# Patient Record
Sex: FEMALE | Race: WHITE
[De-identification: names, ages, dates, MRNs, and addresses within clinical notes are randomized per-mention and may not be internally consistent; named-entity substitution may affect disease eponyms.]

---

## 2017-03-12 ENCOUNTER — HOSPITAL ENCOUNTER (EMERGENCY)
Dept: HOSPITAL 43 - DL.ED | Age: 62
Discharge: HOME | End: 2017-03-12
Payer: MEDICARE

## 2017-03-12 VITALS — DIASTOLIC BLOOD PRESSURE: 81 MMHG | SYSTOLIC BLOOD PRESSURE: 98 MMHG

## 2017-03-12 DIAGNOSIS — M54.6: ICD-10-CM

## 2017-03-12 DIAGNOSIS — Z88.0: ICD-10-CM

## 2017-03-12 DIAGNOSIS — F17.200: ICD-10-CM

## 2017-03-12 DIAGNOSIS — S00.83XA: ICD-10-CM

## 2017-03-12 DIAGNOSIS — E78.00: ICD-10-CM

## 2017-03-12 DIAGNOSIS — I10: ICD-10-CM
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
## 2017-03-12 DIAGNOSIS — Z88.6: ICD-10-CM

## 2017-03-12 DIAGNOSIS — Z88.8: ICD-10-CM

## 2017-03-12 DIAGNOSIS — Z79.899: ICD-10-CM

## 2017-03-12 DIAGNOSIS — Z95.1: ICD-10-CM

## 2017-03-12 DIAGNOSIS — M54.5: Primary | ICD-10-CM

## 2017-03-12 DIAGNOSIS — M19.90: ICD-10-CM
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
## 2017-03-12 DIAGNOSIS — G89.29: ICD-10-CM

## 2017-03-12 DIAGNOSIS — I95.9: ICD-10-CM

## 2017-03-12 DIAGNOSIS — F41.9: ICD-10-CM

## 2017-03-12 DIAGNOSIS — Z91.81: ICD-10-CM

## 2017-03-12 DIAGNOSIS — E03.9: ICD-10-CM

## 2017-03-12 LAB
CHLORIDE SERPL-SCNC: 99 MMOL/L (ref 101–111)
SODIUM SERPL-SCNC: 132 MMOL/L (ref 135–145)

## 2017-03-12 PROCEDURE — 70450 CT HEAD/BRAIN W/O DYE: CPT

## 2017-03-12 PROCEDURE — 36415 COLL VENOUS BLD VENIPUNCTURE: CPT

## 2017-03-12 PROCEDURE — 72131 CT LUMBAR SPINE W/O DYE: CPT

## 2017-03-12 PROCEDURE — 72192 CT PELVIS W/O DYE: CPT

## 2017-03-12 PROCEDURE — G0480 DRUG TEST DEF 1-7 CLASSES: HCPCS

## 2017-03-12 PROCEDURE — 85025 COMPLETE CBC W/AUTO DIFF WBC: CPT

## 2017-03-12 PROCEDURE — 99285 EMERGENCY DEPT VISIT HI MDM: CPT

## 2017-03-12 PROCEDURE — 72128 CT CHEST SPINE W/O DYE: CPT

## 2017-03-12 PROCEDURE — 80053 COMPREHEN METABOLIC PANEL: CPT

## 2017-03-12 NOTE — EDM.PDOC
ED HPI LOWER BACK PAIN/INJURY





- General


Chief Complaint: Back Pain or Injury


Stated Complaint: IN BY AMBULANCE


Time Seen by Provider: 03/12/17 01:10


Source of Information: Reports: Patient, EMS


History Limitations: Reports: No limitations





- History of Present Illness


INITIAL COMMENTS - FREE TEXT/NARRATIVE: 





c/o low back and left hip pain, Patient reports falling while pulling pajama 

bottoms up. EMS report patient fell in very small bathroom inbetween toilet and 

bathtub, difficult to get patient out of area. Arrive on  long vacuum splint. 

No c/o neck pain. Hx frequent fall and balance problems.  Fell forward last 

week while scrubbing floor then hit head on cupboard while attempting to get 

up. 








- Related Data


Allergies/ADRs: 


 Allergies











Allergy/AdvReac Type Severity Reaction Status Date / Time


 


codeine Allergy  Hives Verified 03/12/17 01:11


 


duloxetine HCl Allergy  Hives Verified 03/12/17 01:11





[From Cymbalta]     


 


gabapentin [From Neurontin] Allergy  Hives Verified 03/12/17 01:11


 


oxycodone [Oxycodone] Allergy  Hives Verified 03/12/17 01:11


 


Penicillins Allergy  Hives Verified 03/12/17 01:11











Home Meds: 


 Home Meds





ALPRAZolam [Xanax] 1 mg PO ASDIRECTED PRN 07/03/14 [History]


ALPRAZolam [Xanax] 2 mg PO QPM 07/03/14 [History]


Clopidogrel [Plavix] 75 mg PO DAILY 07/03/14 [History]


Levothyroxine [Sythroid] 25 mcg PO DAILY 07/03/14 [History]


Metoprolol Tartrate [Lopressor] 25 mg PO Q12HR 07/03/14 [History]


Sertraline [Zoloft] 200 mg PO DAILY 07/03/14 [History]


Simvastatin [Zocor] 40 mg PO BEDTIME 07/03/14 [History]


buPROPion HCl [buPROPion SR] 200 mg PO BID 07/03/14 [History]


Doxepin [SINEquan] 25 mg PO BEDTIME 03/12/17 [History]











Past Medical History


Cardiovascular History: Reports: Bypass, High cholesterol, Hypertension


Musculoskeletal History: Reports: Arthritis, Back pain, chronic


Psychiatric History: Reports: Anxiety


Endocrine/Metabolic History: Reports: Hypothyroidism


Oncologic (Cancer) History: Reports: Other (see below)


Other Oncologic History: right ear





- Past Surgical History


Cardiovascular Surgical History: Reports: Coronary artery bypass





Social & Family History





- Tobacco Use


Smoking Status *Q: Current Every Day Smoker


Years of Tobacco use: 49


Packs/Tins Daily: 0.5


Second Hand Smoke Exposure: Yes





- Alcohol Use


Days Per Week of Alcohol Use: 0





- Recreational Drug Use


Recreational Drug Use: No





ED ROS GENERAL





- Review of Systems


Review Of Systems: See Below


Constitutional: Reports: no symptoms


HEENT: Reports: No symptoms


Respiratory: Reports: no symptoms


Cardiovascular: Reports: No symptoms (intermittent worse with position change 

present for years), Lightheadedness


GI/Abdominal: Reports: No symptoms


Musculoskeletal: Reports: back pain (mid to low back, increase pain with 

movement left hip)





ED EXAM,LOWER BACK PAIN/INJURY





- Physical Exam


Exam: See Below


Exam Limited By: No limitations


General Appearance: alert, other (responses slow)


Eye Exam: bilateral eye: EOMI, PERRL


Ears: normal external exam, normal TMs


Nose: normal inspection


Throat/Mouth: Normal inspection, Other (dry mouth)


Head: normocephalic, other (greeish red hematoma right lateral forehead mild 

tenderness with aplpation)


Neck: normal inspection, full range of motion


Respiratory/Chest: no respiratory distress, lungs clear, normal breath sounds


Cardiovascular: normal peripheral pulses, regular rate, rhythm, no edema


GI/Abdominal: normal bowel sounds, soft


Back Exam: paraspinal tenderness (lumbar), vertebral tenderness (mid thoracic)


Extremities: normal inspection, other (pain left hip low back with movement. ).

  No: normal range of motion, pedal edema, joint swelling, arm pain


Neurological: alert, normal mood/affect, normal plantar flexion, no motor/

sensory deficits, oriented x 3, other (equal strength bilateral no weakness 

pain left hip sacrum with movement )


Psychiatric: normal affect


Skin Exam: Warm, Dry, Intact, Ecchymosis (right forehead)





Course





- Vital Signs


Last Recorded V/S: 





 Last Vital Signs











Temp  98.2 F   03/12/17 01:06


 


Pulse  67   03/12/17 01:06


 


Resp  18   03/12/17 01:06


 


BP  98/81   03/12/17 01:06


 


Pulse Ox  96   03/12/17 01:06














- Orders/Labs/Meds


Orders: 





 Active Orders 24 hr











 Category Date Time Status


 


 Head wo Cont [CT] Urgent Exams  03/12/17 01:14 Ordered


 


 Lumbar Spine wo Cont [CT] Urgent Exams  03/12/17 01:14 Ordered


 


 Pelvis wo Cont [CT] Urgent Exams  03/12/17 01:14 Ordered


 


 Thoracic Spine wo Cont [CT] Urgent Exams  03/12/17 01:14 Ordered


 


 COMPREHENSIVE METABOLIC PN,CMP [CHEM] Stat Lab  03/12/17 01:20 Received


 


 ETOH [ETHANOL BLOOD MEDICAL] [CHEM] Stat Lab  03/12/17 01:35 Ordered


 


 UA W/MICROSCOPIC [URIN] Stat Lab  03/12/17 01:16 Uncollected











Labs: 





 Laboratory Tests











  03/12/17 Range/Units





  01:20 


 


WBC  8.9  (5.0-10.0)  10^3/uL


 


RBC  3.47 L  (4.2-5.4)  10^6/uL


 


Hgb  10.7 L  (12.0-16.0)  g/dL


 


Hct  33.1 L  (37.0-47.0)  %


 


MCV  95.4  ()  fL


 


MCH  30.8  (27.0-34.0)  pg


 


MCHC  32.3 L  (33.0-35.0)  g/dL


 


Plt Count  201  (150-450)  10^3/uL


 


Neut % (Auto)  76.2 H  (42.2-75.2)  %


 


Lymph % (Auto)  16.8 L  (20.5-50.1)  %


 


Mono % (Auto)  6.3  (2-8)  %


 


Eos % (Auto)  0.5 L  (1.0-3.0)  %


 


Baso % (Auto)  0.2  (0.0-1.0)  %














- Radiology Interpretation


Free Text/Narrative:: 





Hed, thoracic, lumbar and pelvis negative. Up at bedside. Initial BP 70's 

systolic. Patient notes that is her "normal". Recheck 90"s. Ambulatory with 

standby. Gait steady.  Declines any need for pain medication. 





Departure





- Departure


Time of Disposition: 03:41


Disposition: Home, Self-Care 01


Condition: good


Clinical Impression: 


 Balance problem





Fall at home


Qualifiers:


 Encounter type: initial encounter Qualified Code(s): W19.XXXA - Unspecified 

fall, initial encounter; Y92.099 - Unspecified place in other non-institutional 

residence as the place of occurrence of the external cause





Back pain


Qualifiers:


 Back pain location: low back pain Chronicity: acute Back pain laterality: 

right Sciatica presence: without sciatica Qualified Code(s): M54.5 - Low back 

pain





Traumatic ecchymosis of forehead


Qualifiers:


 Encounter type: initial encounter Qualified Code(s): S00.83XA - Contusion of 

other part of head, initial encounter





Thoracic back pain


Qualifiers:


 Chronicity: acute Back pain laterality: midline Qualified Code(s): M54.6 - 

Pain in thoracic spine





Hypotension


Qualifiers:


 Hypotension type: unspecified hypotension type Qualified Code(s): I95.9 - 

Hypotension, unspecified





Instructions:  Back Pain, Adult, Easy-to-Read


Forms:  ED Department Discharge


Additional Instructions: 


tyleno or ibuprofen for discomfort


rest


recommend walker 


change positions slowly


follow up with cardiologist or primary care to discuss blood pressures and fall 

frequency. 





- My Orders


Last 24 Hours: 





My Active Orders





03/12/17 01:14


Head wo Cont [CT] Urgent 


Lumbar Spine wo Cont [CT] Urgent 


Pelvis wo Cont [CT] Urgent 


Thoracic Spine wo Cont [CT] Urgent 





03/12/17 01:16


UA W/MICROSCOPIC [URIN] Stat 





03/12/17 01:20


COMPREHENSIVE METABOLIC PN,CMP [CHEM] Stat 





03/12/17 01:35


ETOH [ETHANOL BLOOD MEDICAL] [CHEM] Stat 














- Assessment/Plan


Last 24 Hours: 





My Active Orders





03/12/17 01:14


Head wo Cont [CT] Urgent 


Lumbar Spine wo Cont [CT] Urgent 


Pelvis wo Cont [CT] Urgent 


Thoracic Spine wo Cont [CT] Urgent 





03/12/17 01:16


UA W/MICROSCOPIC [URIN] Stat 





03/12/17 01:20


COMPREHENSIVE METABOLIC PN,CMP [CHEM] Stat 





03/12/17 01:35


ETOH [ETHANOL BLOOD MEDICAL] [CHEM] Stat

## 2017-03-28 ENCOUNTER — HOSPITAL ENCOUNTER (OUTPATIENT)
Dept: HOSPITAL 43 - DL.ED | Age: 62
Setting detail: OBSERVATION
LOS: 1 days | Discharge: HOME | End: 2017-03-29
Attending: HOSPITALIST | Admitting: HOSPITALIST
Payer: MEDICARE

## 2017-03-28 DIAGNOSIS — Z79.82: ICD-10-CM

## 2017-03-28 DIAGNOSIS — J20.9: ICD-10-CM

## 2017-03-28 DIAGNOSIS — F32.9: ICD-10-CM

## 2017-03-28 DIAGNOSIS — E78.00: ICD-10-CM

## 2017-03-28 DIAGNOSIS — I10: ICD-10-CM

## 2017-03-28 DIAGNOSIS — F41.9: ICD-10-CM

## 2017-03-28 DIAGNOSIS — Z88.0: ICD-10-CM

## 2017-03-28 DIAGNOSIS — Z79.02: ICD-10-CM

## 2017-03-28 DIAGNOSIS — E05.90: ICD-10-CM

## 2017-03-28 DIAGNOSIS — F17.210: ICD-10-CM

## 2017-03-28 DIAGNOSIS — Z79.52: ICD-10-CM

## 2017-03-28 DIAGNOSIS — R07.9: Primary | ICD-10-CM

## 2017-03-28 DIAGNOSIS — Z95.5: ICD-10-CM

## 2017-03-28 DIAGNOSIS — Z88.5: ICD-10-CM

## 2017-03-28 DIAGNOSIS — I25.810: ICD-10-CM

## 2017-03-28 DIAGNOSIS — Z79.899: ICD-10-CM

## 2017-03-28 DIAGNOSIS — Z88.8: ICD-10-CM

## 2017-03-28 LAB
CHLORIDE SERPL-SCNC: 103 MMOL/L (ref 101–111)
SODIUM SERPL-SCNC: 135 MMOL/L (ref 135–145)

## 2017-03-28 PROCEDURE — 36415 COLL VENOUS BLD VENIPUNCTURE: CPT

## 2017-03-28 PROCEDURE — 99285 EMERGENCY DEPT VISIT HI MDM: CPT

## 2017-03-28 PROCEDURE — 93010 ELECTROCARDIOGRAM REPORT: CPT

## 2017-03-28 PROCEDURE — 84484 ASSAY OF TROPONIN QUANT: CPT

## 2017-03-28 PROCEDURE — 71020: CPT

## 2017-03-28 PROCEDURE — 96374 THER/PROPH/DIAG INJ IV PUSH: CPT

## 2017-03-28 PROCEDURE — 81001 URINALYSIS AUTO W/SCOPE: CPT

## 2017-03-28 PROCEDURE — 96375 TX/PRO/DX INJ NEW DRUG ADDON: CPT

## 2017-03-28 PROCEDURE — 85610 PROTHROMBIN TIME: CPT

## 2017-03-28 PROCEDURE — G0378 HOSPITAL OBSERVATION PER HR: HCPCS

## 2017-03-28 PROCEDURE — 82553 CREATINE MB FRACTION: CPT

## 2017-03-28 PROCEDURE — 93005 ELECTROCARDIOGRAM TRACING: CPT

## 2017-03-28 PROCEDURE — 82550 ASSAY OF CK (CPK): CPT

## 2017-03-28 PROCEDURE — 85730 THROMBOPLASTIN TIME PARTIAL: CPT

## 2017-03-28 PROCEDURE — 96361 HYDRATE IV INFUSION ADD-ON: CPT

## 2017-03-28 PROCEDURE — 80048 BASIC METABOLIC PNL TOTAL CA: CPT

## 2017-03-28 PROCEDURE — 85025 COMPLETE CBC W/AUTO DIFF WBC: CPT

## 2017-03-28 RX ADMIN — HEPARIN SODIUM SCH: 5000 INJECTION, SOLUTION INTRAVENOUS; SUBCUTANEOUS at 21:21

## 2017-03-28 NOTE — EDM.PDOC
ED HISTORY OF PRESENT ILLNESS





- General


Chief Complaint: Chest Pain


Stated Complaint: CHEST PAIN [phone]


Time Seen by Provider: 03/28/17 16:37


Source of Information: Reports: Patient


History Limitations: Reports: No limitations





- History of Present Illness


INITIAL COMMENTS - FREE TEXT/NARRATIVE: 





Pt states that she has been having a cough and chest congestion for the past 4 

weeks and today. she begin having sharp right sided that radiates to her neck. 

denies shortness of breath, n/v/d.


Symptom Onset Date: 03/28/17


Timing/Duration: Reports: Getting worse


Severity: moderate


Location, General: Reports: chest


Quality: Reports: Ache, Pressure, Same as previous episode


Improves with: Reports: None


Worsens with: Reports: None


Associated Symptoms (General): Reports: no other symptoms





- Related Data


Allergies/ADRs: 


 Allergies











Allergy/AdvReac Type Severity Reaction Status Date / Time


 


codeine Allergy  Hives Verified 03/28/17 16:20


 


duloxetine HCl Allergy  Hives Verified 03/28/17 16:20





[From Cymbalta]     


 


gabapentin [From Neurontin] Allergy  Hives Verified 03/28/17 16:20


 


oxycodone [Oxycodone] Allergy  Hives Verified 03/28/17 16:20


 


Penicillins Allergy  Hives Verified 03/28/17 16:20











Home Meds: 


 Home Meds





ALPRAZolam [Xanax] 1 mg PO ASDIRECTED PRN 07/03/14 [History]


ALPRAZolam [Xanax] 2 mg PO QPM 07/03/14 [History]


Clopidogrel [Plavix] 75 mg PO DAILY 07/03/14 [History]


Levothyroxine [Sythroid] 25 mcg PO DAILY 07/03/14 [History]


Metoprolol Tartrate [Lopressor] 25 mg PO Q12HR 07/03/14 [History]


Sertraline [Zoloft] 200 mg PO DAILY 07/03/14 [History]


Simvastatin [Zocor] 40 mg PO BEDTIME 07/03/14 [History]


buPROPion HCl [buPROPion SR] 200 mg PO BID 07/03/14 [History]


Doxepin [SINEquan] 25 mg PO BEDTIME 03/12/17 [History]


predniSONE [predniSONE] 20 mg PO DAILY 03/28/17 [History]











Past Medical History


Cardiovascular History: Reports: High cholesterol, Hypertension


Psychiatric History: Reports: Anxiety, Depression


Endocrine/Metabolic History: Reports: Hyperthyroidism





- Past Surgical History


HEENT Surgical History: Reports: Other (see below)


Other HEENT Surgeries/Procedures: right ear surgery


Cardiovascular Surgical History: Reports: Coronary artery bypass, Other (see 

below)


Other Cardiovascular Surgeries/Procedures: stents





Social & Family History





- Family History


Family Medical History: Noncontributory





- Tobacco Use


Smoking Status *Q: Current Every Day Smoker


Years of Tobacco use: 49


Packs/Tins Daily: 0.5


Second Hand Smoke Exposure: Yes





- Caffeine Use


Caffeine Use: Reports: Coffee





- Alcohol Use


Days Per Week of Alcohol Use: 0





- Recreational Drug Use


Recreational Drug Use: No





ED ROS GENERAL





- Review of Systems


Review Of Systems: See Below


Cardiovascular: Reports: Chest pain





ED EXAM, GENERAL





- Physical Exam


Exam: See Below


Exam Limited By: No limitations


General Appearance: alert, WD/WN, no apparent distress


Eye Exam: bilateral eye: PERRL


Ears: normal external exam, normal canal, hearing grossly normal, normal TMs


Ear Exam: bilateral ear: auricle normal, canal normal, TM normal


Head: atraumatic, normocephalic


Neck: normal inspection, supple, non-tender, full range of motion


Respiratory/Chest: no respiratory distress, lungs clear, normal breath sounds, 

no accessory muscle use, chest non-tender


Cardiovascular: normal peripheral pulses, regular rate, rhythm, no edema, no 

gallop, no JVD, no murmur, no rub


GI/Abdominal: normal bowel sounds, soft, non tender, no organomegaly, no 

distention, no abnormal bruit, no mass


Neurological: alert, oriented, CN II-XII intact, normal cognition, normal gait, 

normal reflexes, no motor/sensory deficits





Course





- Vital Signs


Last Recorded V/S: 


 Last Vital Signs











Temp  97.3 F   03/28/17 16:15


 


Pulse  52 L  03/28/17 16:15


 


Resp  18   03/28/17 16:15


 


BP  135/72   03/28/17 16:15


 


Pulse Ox  97   03/28/17 16:15














- Orders/Labs/Meds


Orders: 


 Active Orders 24 hr











 Category Date Time Status


 


 Cardiac Monitoring [RC] .AS DIRECTED Care  03/28/17 16:45 Active


 


 EKG Documentation Completion [RC] STAT Care  03/28/17 16:46 Active


 


 RT Aerosol Therapy [RC] ASDIRECTED Care  03/28/17 18:53 Active


 


 Sodium Chloride 0.9% [Saline Flush] Med  03/28/17 16:45 Active





 10 ml FLUSH ASDIRECTED PRN   


 


 Saline Lock Insert [OM.PC] Stat Oth  03/28/17 16:45 Ordered








 Medication Orders





Sodium Chloride (Saline Flush)  10 ml FLUSH ASDIRECTED PRN


   PRN Reason: Keep Vein Open


   Last Admin: 03/28/17 17:05  Dose: 10 ml








Labs: 


 Laboratory Tests











  03/28/17 03/28/17 03/28/17 Range/Units





  16:55 16:55 16:55 


 


WBC  11.9 H    (5.0-10.0)  10^3/uL


 


RBC  3.67 L    (4.2-5.4)  10^6/uL


 


Hgb  11.3 L    (12.0-16.0)  g/dL


 


Hct  34.7 L    (37.0-47.0)  %


 


MCV  94.6    ()  fL


 


MCH  30.8    (27.0-34.0)  pg


 


MCHC  32.6 L    (33.0-35.0)  g/dL


 


Plt Count  328    (150-450)  10^3/uL


 


Neut % (Auto)  86.3 H    (42.2-75.2)  %


 


Lymph % (Auto)  11.3 L    (20.5-50.1)  %


 


Mono % (Auto)  1.8 L    (2-8)  %


 


Eos % (Auto)  0.3 L    (1.0-3.0)  %


 


Baso % (Auto)  0.3    (0.0-1.0)  %


 


PT   10.3   (9.0-12.0)  SEC


 


INR   1.0   (0.9-1.2)  


 


APTT   24.8   (22.0-34.0)  SEC


 


Sodium    135  (135-145)  mmol/L


 


Potassium    4.3  (3.6-5.0)  mmol/L


 


Chloride    103  (101-111)  mmol/L


 


Carbon Dioxide    23.0  (21.0-31.0)  mmol/L


 


Anion Gap    13.3  


 


BUN    20 H  (7-18)  mg/dL


 


Creatinine    1.2  (0.6-1.3)  mg/dL


 


Est Cr Clr Drug Dosing    47.27  mL/min


 


Estimated GFR (MDRD)    46  


 


Glucose    108 H  ()  mg/dL


 


Calcium    8.9  (8.4-10.2)  mg/dl


 


Creatine Kinase     ()  IU/L


 


Creatine Kinase Index     (0-2.4)  %


 


CK-MB (CK-2)     (0.4-4.7)  ng/mL


 


Troponin I    < 0.02  (0.00-0.02)  ng/ml


 


Urine Color     (YELLOW)  


 


Urine Appearance     (CLEAR)  


 


Urine pH     (5.0-9.0)  


 


Ur Specific Gravity     (1.005-1.030)  


 


Urine Protein     (NEGATIVE)  


 


Urine Glucose (UA)     (NEGATIVE)  


 


Urine Ketones     (NEGATIVE)  


 


Urine Occult Blood     (NEGATIVE)  


 


Urine Nitrite     (NEGATIVE)  


 


Urine Bilirubin     (NEGATIVE)  


 


Urine Urobilinogen     (0.2-1.0)  mg/dL


 


Ur Leukocyte Esterase     (NEGATIVE)  


 


Urine RBC     /HPF


 


Urine WBC     (0-5/HPF)  /HPF


 


Ur Epithelial Cells     /HPF


 


Amorphous Sediment     (0/HPF)  /HPF


 


Urine Bacteria     (0-FEW/HPF)  /HPF


 


Urine Mucus     /LPF














  03/28/17 03/28/17 Range/Units





  16:55 17:10 


 


WBC    (5.0-10.0)  10^3/uL


 


RBC    (4.2-5.4)  10^6/uL


 


Hgb    (12.0-16.0)  g/dL


 


Hct    (37.0-47.0)  %


 


MCV    ()  fL


 


MCH    (27.0-34.0)  pg


 


MCHC    (33.0-35.0)  g/dL


 


Plt Count    (150-450)  10^3/uL


 


Neut % (Auto)    (42.2-75.2)  %


 


Lymph % (Auto)    (20.5-50.1)  %


 


Mono % (Auto)    (2-8)  %


 


Eos % (Auto)    (1.0-3.0)  %


 


Baso % (Auto)    (0.0-1.0)  %


 


PT    (9.0-12.0)  SEC


 


INR    (0.9-1.2)  


 


APTT    (22.0-34.0)  SEC


 


Sodium    (135-145)  mmol/L


 


Potassium    (3.6-5.0)  mmol/L


 


Chloride    (101-111)  mmol/L


 


Carbon Dioxide    (21.0-31.0)  mmol/L


 


Anion Gap    


 


BUN    (7-18)  mg/dL


 


Creatinine    (0.6-1.3)  mg/dL


 


Est Cr Clr Drug Dosing    mL/min


 


Estimated GFR (MDRD)    


 


Glucose    ()  mg/dL


 


Calcium    (8.4-10.2)  mg/dl


 


Creatine Kinase  42   ()  IU/L


 


Creatine Kinase Index  2.4   (0-2.4)  %


 


CK-MB (CK-2)  1.00   (0.4-4.7)  ng/mL


 


Troponin I    (0.00-0.02)  ng/ml


 


Urine Color   Yellow  (YELLOW)  


 


Urine Appearance   Slightly cloudy  (CLEAR)  


 


Urine pH   5.0  (5.0-9.0)  


 


Ur Specific Gravity   1.010  (1.005-1.030)  


 


Urine Protein   Negative  (NEGATIVE)  


 


Urine Glucose (UA)   Negative  (NEGATIVE)  


 


Urine Ketones   Negative  (NEGATIVE)  


 


Urine Occult Blood   Negative  (NEGATIVE)  


 


Urine Nitrite   Negative  (NEGATIVE)  


 


Urine Bilirubin   Negative  (NEGATIVE)  


 


Urine Urobilinogen   0.2  (0.2-1.0)  mg/dL


 


Ur Leukocyte Esterase   Negative  (NEGATIVE)  


 


Urine RBC   0-5  /HPF


 


Urine WBC   0-5  (0-5/HPF)  /HPF


 


Ur Epithelial Cells   Few  /HPF


 


Amorphous Sediment   Moderate H  (0/HPF)  /HPF


 


Urine Bacteria   Rare  (0-FEW/HPF)  /HPF


 


Urine Mucus   Moderate H  /LPF











Meds: 


Medications











Generic Name Dose Route Start Last Admin





  Trade Name Freq  PRN Reason Stop Dose Admin


 


Sodium Chloride  10 ml  03/28/17 16:45  03/28/17 17:05





  Saline Flush  FLUSH   10 ml





  ASDIRECTED PRN   Administration





  Keep Vein Open   














Discontinued Medications














Generic Name Dose Route Start Last Admin





  Trade Name Freq  PRN Reason Stop Dose Admin


 


Albuterol/Ipratropium  3 ml  03/28/17 18:53  03/28/17 19:07





  Duoneb 3.0-0.5 Mg/3 Ml  NEB  03/28/17 18:54  3 ml





  ONETIME ONE   Administration


 


Aspirin  324 mg  03/28/17 16:46  03/28/17 17:03





  Aspirin  PO  03/28/17 16:47  324 mg





  ONETIME ONE   Administration


 


Sodium Chloride  1,000 mls @ 1,000 mls/hr  03/28/17 16:45  03/28/17 17:04





  Normal Saline  IV  03/28/17 17:44  1,000 mls/hr





  .BOLUS ONE   Administration


 


Morphine Sulfate  4 mg  03/28/17 16:46  03/28/17 17:03





  Morphine  IVPUSH  03/28/17 16:47  4 mg





  ONETIME ONE   Administration


 


Ondansetron HCl  4 mg  03/28/17 16:48  03/28/17 17:03





  Zofran  IV  03/28/17 16:49  4 mg





  ONETIME ONE   Administration














- Re-Assessments/Exams


Free Text/Narrative Re-Assessment/Exam: 





03/28/17 19:21


Spoke with Dr. Anand for admission. will evaluate pt shortly





03/28/17 19:40


Admission per Dr. anand





Departure





- Departure


Time of Disposition: 19:39


Disposition: Admitted As Inpatient 65


Condition: good


Clinical Impression: 


 Acute coronary syndrome





Instructions:  Nonspecific Chest Pain, Easy-to-Read


Forms:  ED Department Discharge





- My Orders


Last 24 Hours: 


My Active Orders





03/28/17 16:45


Cardiac Monitoring [RC] .AS DIRECTED 


Sodium Chloride 0.9% [Saline Flush]   10 ml FLUSH ASDIRECTED PRN 


Saline Lock Insert [OM.PC] Stat 





03/28/17 16:46


EKG Documentation Completion [RC] STAT 





03/28/17 18:53


RT Aerosol Therapy [RC] ASDIRECTED 














- Assessment/Plan


Last 24 Hours: 


My Active Orders





03/28/17 16:45


Cardiac Monitoring [RC] .AS DIRECTED 


Sodium Chloride 0.9% [Saline Flush]   10 ml FLUSH ASDIRECTED PRN 


Saline Lock Insert [OM.PC] Stat 





03/28/17 16:46


EKG Documentation Completion [RC] STAT 





03/28/17 18:53


RT Aerosol Therapy [RC] ASDIRECTED

## 2017-03-29 VITALS — DIASTOLIC BLOOD PRESSURE: 71 MMHG | SYSTOLIC BLOOD PRESSURE: 116 MMHG

## 2017-03-29 RX ADMIN — HEPARIN SODIUM SCH: 5000 INJECTION, SOLUTION INTRAVENOUS; SUBCUTANEOUS at 05:40

## 2017-03-29 NOTE — EKG
03/28/2017 - MERCEDES OLIVARES -

 

TIME:  1611 p.m.

 

EKG shows sinus bradycardia.  There is incomplete right bundle branch block.

Nonspecific T-wave abnormalities.

 

DD:  03/29/2017 20:36:43

DT:  03/29/2017 22:26:30

[facility]

Job #:  [id number]

## 2017-03-29 NOTE — HP
CHIEF COMPLAINT:  Chest pain.

 

HISTORY OF PRESENT ILLNESS:  Ms. Garrido is a 61-year-old female with medical

history of coronary artery disease, hypertension.  The patient presented with

complaint of chest pain yesterday while she [place].

Started pain of dull in nature on the right side of her chest radiating towards

neck with associated generalized body malaise.  No nausea or vomiting.

 

REVIEW OF SYSTEMS:

A 10-point review of system performed.  No other pertinent findings except as

noted above.

 

PAST MEDICAL HISTORY:

1. Tobacco use disorder.

2. Coronary artery disease.

 

ALLERGIES:  She is allergic to Cymbalta, gabapentin, oxycodone, and penicillin.

 

 

 

FAMILY HISTORY:  Reviewed and considered noncontributory.

 

SOCIAL HISTORY:  Tobacco use disorder.

 

OBJECTIVE:  General:  The patient is alert, oriented to place, time, and person.

Head:  Atraumatic and normocephalic.

Chest:  Clear to auscultation.

CVS:  Regular rate and rhythm.

Abdomen:  Soft, nontender.

Extremities:  No pedal edema.  No finger clubbing.

Skin:  No rash.

Neuro:  Grossly nonfocal.

Vital Signs:  Reviewed.

 

IMAGING:  EKG shows normal sinus rhythm.  There is incomplete right bundle-

branch block.  Q-wave inversion at septal leads.

 

ASSESSMENT AND PLAN:

1. Chest pain.  This is probably musculoskeletal.  However, given her history

    of coronary artery disease with nonspecific EKG changes, I think it is

    prudent to admit the patient to the hospital and rule out acute coronary

    syndrome.

2. Tobacco use disorder.  Counseling provided.



Plan:



3. We will admit the patient to medical floor.

4. Obtain troponin every 6 hours 

5. Aspirin.

6. Keep patient on Plavix.

7. Antiemetic protocol.

8. Intravenous fluid and normal saline at 75 mL an hour.

 

DD:  03/28/2017 22:31:43

DT:  03/29/2017 02:09:40

[facility]

Job #:  [id number]

OTTO

## 2017-03-30 NOTE — DISCH
FINAL DIAGNOSES:

1. Chest pain, likely musculoskeletal.

2. Tobacco use disorder.

3. Possible chronic obstructive pulmonary disease.

4. Acute bronchitis.

5. Hypertension.

6. Coronary artery disease.

 

SUMMARY OF HOSPITAL COURSE:  Ms. Natalee Garrido presented with complaint of

right-sided chest pain.  She was evaluated with cardiac enzymes and EKG.

Cardiac enzymes were negative.  She indicates that she has been coughing and

does have some shortness of breath.  Sometimes she expectorates the greenish

sputum.  Her chest x-ray did not show obvious infiltrate.  The patient probably

has acute bronchitis.  I started her on doxycycline for 7 days.  She will follow

up with her primary care provider.  She has also been advised to follow up with

Cardiology for possible stress test.  She is known to have coronary artery

disease with previous bypass.

 

PHYSICAL EXAMINATION:

General:  At discharge, the patient is alert, oriented to place, time, and

person.

Head:  Atraumatic and normocephalic.

Ear, Nose, and throat:  Unremarkable.

Chest:  Diminished air entry bilaterally.

CVS:  Regular rate and rhythm.

Abdomen:  Soft, nontender.

Extremities:  No pedal edema.  No finger clubbing.

Skin:  No rash.

 

DD:  03/29/2017 10:26:07

DT:  03/30/2017 02:30:17

[facility]

Job #:  [id number]

## 2017-11-14 ENCOUNTER — HOSPITAL ENCOUNTER (EMERGENCY)
Dept: HOSPITAL 43 - DL.ED | Age: 62
LOS: 1 days | Discharge: HOME | End: 2017-11-15
Payer: MEDICARE

## 2017-11-14 VITALS — DIASTOLIC BLOOD PRESSURE: 64 MMHG | SYSTOLIC BLOOD PRESSURE: 141 MMHG

## 2017-11-14 DIAGNOSIS — F32.9: ICD-10-CM

## 2017-11-14 DIAGNOSIS — Z88.6: ICD-10-CM

## 2017-11-14 DIAGNOSIS — Z79.82: ICD-10-CM

## 2017-11-14 DIAGNOSIS — E78.00: ICD-10-CM

## 2017-11-14 DIAGNOSIS — Z79.899: ICD-10-CM

## 2017-11-14 DIAGNOSIS — Z88.5: ICD-10-CM

## 2017-11-14 DIAGNOSIS — F17.210: ICD-10-CM

## 2017-11-14 DIAGNOSIS — K59.04: ICD-10-CM

## 2017-11-14 DIAGNOSIS — N39.0: Primary | ICD-10-CM

## 2017-11-14 DIAGNOSIS — Z88.8: ICD-10-CM

## 2017-11-14 DIAGNOSIS — Z79.02: ICD-10-CM

## 2017-11-14 DIAGNOSIS — Z88.0: ICD-10-CM

## 2017-11-14 DIAGNOSIS — I10: ICD-10-CM

## 2017-11-14 LAB
CHLORIDE SERPL-SCNC: 101 MMOL/L (ref 101–111)
SODIUM SERPL-SCNC: 136 MMOL/L (ref 135–145)

## 2017-11-14 PROCEDURE — 96361 HYDRATE IV INFUSION ADD-ON: CPT

## 2017-11-14 PROCEDURE — 96365 THER/PROPH/DIAG IV INF INIT: CPT

## 2017-11-14 PROCEDURE — 36415 COLL VENOUS BLD VENIPUNCTURE: CPT

## 2017-11-14 PROCEDURE — 93010 ELECTROCARDIOGRAM REPORT: CPT

## 2017-11-14 PROCEDURE — 85025 COMPLETE CBC W/AUTO DIFF WBC: CPT

## 2017-11-14 PROCEDURE — 81001 URINALYSIS AUTO W/SCOPE: CPT

## 2017-11-14 PROCEDURE — 74020: CPT

## 2017-11-14 PROCEDURE — 87086 URINE CULTURE/COLONY COUNT: CPT

## 2017-11-14 PROCEDURE — 99284 EMERGENCY DEPT VISIT MOD MDM: CPT

## 2017-11-14 PROCEDURE — 80053 COMPREHEN METABOLIC PANEL: CPT

## 2017-11-14 PROCEDURE — 84484 ASSAY OF TROPONIN QUANT: CPT

## 2017-11-14 PROCEDURE — 93005 ELECTROCARDIOGRAM TRACING: CPT

## 2017-11-14 NOTE — EDM.PDOC
ED HPI GENERAL MEDICAL PROBLEM





- General


Chief Complaint: Gastrointestinal Problem


Stated Complaint: IN BY AMBULANCE [id number]


Time Seen by Provider: 11/14/17 22:39


Source of Information: Reports: Patient, EMS Notes Reviewed


History Limitations: Reports: No Limitations





- History of Present Illness


INITIAL COMMENTS - FREE TEXT/NARRATIVE: 





61 yo Female c/o weakness and nausea X one week  Pt. states very little oral 

intake and emesis last friday and today of soup


Onset Date: 11/07/17


Onset Time: 17:00


Duration: Day(s):


Location: Reports: Abdomen, Generalized


Severity: Moderate


Improves with: Reports: None


Worsens with: Reports: None


Associated Symptoms: Reports: Loss of Appetite, Malaise, Nausea/Vomiting, 

Weakness


  ** Headache


Pain Score (Numeric/FACES): 6





- Related Data


 Allergies











Allergy/AdvReac Type Severity Reaction Status Date / Time


 


codeine Allergy  Hives Verified 11/14/17 22:37


 


duloxetine HCl Allergy  Hives Verified 11/14/17 22:37





[From Cymbalta]     


 


gabapentin [From Neurontin] Allergy  Hives Verified 11/14/17 22:37


 


oxycodone [Oxycodone] Allergy  Hives Verified 11/14/17 22:37


 


Penicillins Allergy  Hives Verified 11/14/17 22:37











Home Meds: 


 Home Meds





ALPRAZolam [Xanax] 1 mg PO ASDIRECTED PRN 07/03/14 [History]


ALPRAZolam [Xanax] 2 mg PO QPM 07/03/14 [History]


Clopidogrel [Plavix] 75 mg PO DAILY 07/03/14 [History]


Levothyroxine [Synthroid] 25 mcg PO DAILY 07/03/14 [History]


Metoprolol Tartrate [Lopressor] 25 mg PO Q12HR 07/03/14 [History]


Sertraline [Zoloft] 200 mg PO DAILY 07/03/14 [History]


Simvastatin [Zocor] 40 mg PO BEDTIME 07/03/14 [History]


buPROPion HCl [buPROPion SR] 200 mg PO BID 07/03/14 [History]


Doxepin [SINEquan] 25 mg PO BEDTIME 03/12/17 [History]


Albuterol [IMW: Albuterol HFA] 8 gm IH Q6H PRN #8 gm 03/29/17 [Rx]


Doxycycline Monohydrate [IJD: Doxycycline Monohydrate] 100 mg PO .TWICE DAILY #

14 cap 03/29/17 [Rx]


predniSONE 10 mg PO .TAPER #7 tablet 03/29/17 [Rx]


Aspirin 81 mg PO DAILY 11/14/17 [History]











Past Medical History


HEENT History: Reports: Hard of Hearing, Impaired Vision, Otitis Media


Other HEENT History: infection to the right ear


Cardiovascular History: Reports: Bypass, High Cholesterol, Hypertension, Stents


Other Cardiovascular History: triple bypass and 1 stent to the heart


Musculoskeletal History: Reports: Arthritis, Back Pain, Chronic


Psychiatric History: Reports: Anxiety, Depression


Endocrine/Metabolic History: Reports: Hyperthyroidism


Oncologic (Cancer) History: Reports: Other (See Below)


Other Oncologic History: right ear





- Past Surgical History


HEENT Surgical History: Reports: Other (See Below)


Cardiovascular Surgical History: Reports: Coronary Artery Bypass, Other (See 

Below)





Social & Family History





- Family History


Family Medical History: Noncontributory





- Tobacco Use


Smoking Status *Q: Current Every Day Smoker


Years of Tobacco use: 50


Packs/Tins Daily: 0.5


Used Tobacco, but Quit: No


Second Hand Smoke Exposure: Yes





- Caffeine Use


Caffeine Use: Reports: Coffee





- Alcohol Use


Days Per Week of Alcohol Use: 0





- Recreational Drug Use


Recreational Drug Use: No





ED ROS GENERAL





- Review of Systems


Review Of Systems: See Below


Constitutional: Reports: Weakness, Fatigue, Decreased Appetite


HEENT: Reports: No Symptoms


Respiratory: Reports: No Symptoms


Cardiovascular: Reports: No Symptoms


Endocrine: Reports: No Symptoms


GI/Abdominal: Reports: Abdominal Pain, Constipation, Nausea, Vomiting


: Reports: No Symptoms


Musculoskeletal: Reports: No Symptoms


Skin: Reports: No Symptoms


Neurological: Reports: No Symptoms


Psychiatric: Reports: No Symptoms


Hematologic/Lymphatic: Reports: No Symptoms


Immunologic: Reports: No Symptoms





ED EXAM, GENERAL





- Physical Exam


Exam: See Below


Exam Limited By: No Limitations


General Appearance: Alert, No Apparent Distress, Lethargic


Eye Exam: Bilateral Eye: EOMI, PERRL


Ears: Normal External Exam


Nose: Normal Inspection


Throat/Mouth: Normal Inspection


Head: Atraumatic, Normocephalic


Neck: Normal Inspection


Respiratory/Chest: No Respiratory Distress, Lungs Clear


Cardiovascular: Normal Peripheral Pulses, Regular Rate, Rhythm


GI/Abdominal: Soft, Tender (on deep palpation), Abnormal Bowel Sounds (decreased

)


Back Exam: Normal Inspection


Extremities: Normal Inspection, Normal Range of Motion


Neurological: Alert, Oriented, CN II-XII Intact


Psychiatric: Normal Affect


Skin Exam: Warm, Dry, Intact, Normal Color


Lymphatic: No Adenopathy





Course





- Vital Signs


Last Recorded V/S: 


 Last Vital Signs











Temp  36.1 C   11/14/17 22:32


 


Pulse  50 L  11/14/17 22:32


 


Resp  10 L  11/14/17 22:32


 


BP  141/64 H  11/14/17 22:32


 


Pulse Ox  100   11/14/17 22:32














- Orders/Labs/Meds


Orders: 


 Active Orders 24 hr











 Category Date Time Status


 


 EKG Documentation Completion [RC] STAT Care  11/14/17 23:03 Ordered


 


 CULTURE URINE [RM] Stat Lab  11/14/17 23:33 Uncollected


 


 Ciprofloxacin in D5W [Cipro in D5W 400 MG/200 ML] 400 Med  11/14/17 23:34 

Active





 mg   





 Premix Bag 1 bag   





 IV ONETIME   


 


 Sodium Chloride 0.9% [Normal Saline] 1,000 ml Med  11/14/17 22:45 Active





 IV ASDIRECTED   








 Medication Orders





Sodium Chloride (Normal Saline)  1,000 mls @ 125 mls/hr IV ASDIRECTED PAPO


   Last Admin: 11/14/17 23:03  Dose: 125 mls/hr


Ciprofloxacin/Dextrose 400 mg/ (Premix)  200 mls @ 200 mls/hr IV ONETIME ONE


   Stop: 11/15/17 00:33


   Last Admin: 11/14/17 23:48  Dose: 200 mls/hr








Labs: 


 Laboratory Tests











  11/14/17 11/14/17 11/14/17 Range/Units





  22:43 22:43 22:43 


 


WBC  7.6    (5.0-10.0)  10^3/uL


 


RBC  4.19 L    (4.2-5.4)  10^6/uL


 


Hgb  12.6    (12.0-16.0)  g/dL


 


Hct  38.9    (37.0-47.0)  %


 


MCV  92.8    ()  fL


 


MCH  30.1    (27.0-34.0)  pg


 


MCHC  32.4 L    (33.0-35.0)  g/dL


 


Plt Count  245  D    (150-450)  10^3/uL


 


Neut % (Auto)  41.9 L    (42.2-75.2)  %


 


Lymph % (Auto)  47.9    (20.5-50.1)  %


 


Mono % (Auto)  7.7    (2-8)  %


 


Eos % (Auto)  2.1    (1.0-3.0)  %


 


Baso % (Auto)  0.4    (0.0-1.0)  %


 


Sodium   136   (135-145)  mmol/L


 


Potassium   3.7   (3.6-5.0)  mmol/L


 


Chloride   101   (101-111)  mmol/L


 


Carbon Dioxide   26.0   (21.0-31.0)  mmol/L


 


Anion Gap   12.7   


 


BUN   16   (7-18)  mg/dL


 


Creatinine   1.0   (0.6-1.3)  mg/dL


 


Est Cr Clr Drug Dosing   56.00   mL/min


 


Estimated GFR (MDRD)   56   


 


BUN/Creatinine Ratio   16.00   


 


Glucose   97   ()  mg/dL


 


Calcium   9.8   (8.4-10.2)  mg/dl


 


Total Bilirubin   0.3   (0.2-1.0)  mg/dL


 


AST   27   (10-42)  IU/L


 


ALT   21   (10-60)  IU/L


 


Alkaline Phosphatase   84   ()  IU/L


 


Troponin I    < 0.02  (0.00-0.02)  ng/ml


 


Total Protein   8.0   (6.7-8.2)  g/dl


 


Albumin   4.0   (3.2-5.5)  g/dl


 


Globulin   4.0   


 


Albumin/Globulin Ratio   1.00   


 


Urine Color     (YELLOW)  


 


Urine Appearance     (CLEAR)  


 


Urine pH     (5.0-9.0)  


 


Ur Specific Gravity     (1.005-1.030)  


 


Urine Protein     (NEGATIVE)  


 


Urine Glucose (UA)     (NEGATIVE)  


 


Urine Ketones     (NEGATIVE)  


 


Urine Occult Blood     (NEGATIVE)  


 


Urine Nitrite     (NEGATIVE)  


 


Urine Bilirubin     (NEGATIVE)  


 


Urine Urobilinogen     (0.2-1.0)  mg/dL


 


Ur Leukocyte Esterase     (NEGATIVE)  


 


Urine RBC     /HPF


 


Urine WBC     (0-5/HPF)  /HPF


 


Ur Epithelial Cells     /HPF


 


Urine Bacteria     (0-FEW/HPF)  /HPF


 


Hyaline Casts     /LPF














  11/14/17 Range/Units





  23:09 


 


WBC   (5.0-10.0)  10^3/uL


 


RBC   (4.2-5.4)  10^6/uL


 


Hgb   (12.0-16.0)  g/dL


 


Hct   (37.0-47.0)  %


 


MCV   ()  fL


 


MCH   (27.0-34.0)  pg


 


MCHC   (33.0-35.0)  g/dL


 


Plt Count   (150-450)  10^3/uL


 


Neut % (Auto)   (42.2-75.2)  %


 


Lymph % (Auto)   (20.5-50.1)  %


 


Mono % (Auto)   (2-8)  %


 


Eos % (Auto)   (1.0-3.0)  %


 


Baso % (Auto)   (0.0-1.0)  %


 


Sodium   (135-145)  mmol/L


 


Potassium   (3.6-5.0)  mmol/L


 


Chloride   (101-111)  mmol/L


 


Carbon Dioxide   (21.0-31.0)  mmol/L


 


Anion Gap   


 


BUN   (7-18)  mg/dL


 


Creatinine   (0.6-1.3)  mg/dL


 


Est Cr Clr Drug Dosing   mL/min


 


Estimated GFR (MDRD)   


 


BUN/Creatinine Ratio   


 


Glucose   ()  mg/dL


 


Calcium   (8.4-10.2)  mg/dl


 


Total Bilirubin   (0.2-1.0)  mg/dL


 


AST   (10-42)  IU/L


 


ALT   (10-60)  IU/L


 


Alkaline Phosphatase   ()  IU/L


 


Troponin I   (0.00-0.02)  ng/ml


 


Total Protein   (6.7-8.2)  g/dl


 


Albumin   (3.2-5.5)  g/dl


 


Globulin   


 


Albumin/Globulin Ratio   


 


Urine Color  Dark yellow  (YELLOW)  


 


Urine Appearance  Slightly cloudy  (CLEAR)  


 


Urine pH  5.5  (5.0-9.0)  


 


Ur Specific Gravity  >= 1.030  (1.005-1.030)  


 


Urine Protein  Negative  (NEGATIVE)  


 


Urine Glucose (UA)  Negative  (NEGATIVE)  


 


Urine Ketones  Trace H  (NEGATIVE)  


 


Urine Occult Blood  Negative  (NEGATIVE)  


 


Urine Nitrite  Negative  (NEGATIVE)  


 


Urine Bilirubin  Negative  (NEGATIVE)  


 


Urine Urobilinogen  0.2  (0.2-1.0)  mg/dL


 


Ur Leukocyte Esterase  Trace H  (NEGATIVE)  


 


Urine RBC  0-5  /HPF


 


Urine WBC  5-10 H  (0-5/HPF)  /HPF


 


Ur Epithelial Cells  Moderate H  /HPF


 


Urine Bacteria  Moderate H  (0-FEW/HPF)  /HPF


 


Hyaline Casts  Few H  /LPF











Meds: 


Medications











Generic Name Dose Route Start Last Admin





  Trade Name Freq  PRN Reason Stop Dose Admin


 


Sodium Chloride  1,000 mls @ 125 mls/hr  11/14/17 22:45  11/14/17 23:03





  Normal Saline  IV   125 mls/hr





  ASDIRECTED PAPO   Administration


 


Ciprofloxacin/Dextrose 400 mg/  200 mls @ 200 mls/hr  11/14/17 23:34  11/14/17 

23:48





  Premix  IV  11/15/17 00:33  200 mls/hr





  ONETIME ONE   Administration














Discontinued Medications














Generic Name Dose Route Start Last Admin





  Trade Name Octaviano  PRN Reason Stop Dose Admin


 


Nitrofurantoin Macrocrystals  100 mg  11/15/17 00:09  





  Macrobid  PO  11/15/17 00:10  





  ONETIME ONE   


 


Ondansetron HCl  4 mg  11/14/17 22:41  11/14/17 23:04





  Zofran  IV  11/14/17 22:42  4 mg





  ONETIME ONE   Administration














Departure





- Departure


Time of Disposition: 00:12


Disposition: Home, Self-Care 01


Condition: Good


Clinical Impression: 


 UTI, Urinary tract infectious disease





Constipation


Qualifiers:


 Constipation type: chronic idiopathic constipation Qualified Code(s): K59.04 - 

Chronic idiopathic constipation








- Discharge Information


Instructions:  Constipation, Adult, Easy-to-Read


Forms:  ED Department Discharge


Additional Instructions: 


Rest





Increase intake of Water





Increase intake of Fresh Fruits and Vegetables





Today:  1)  Sip one bottle of Magnesium Citrate and Give self one Fleet Enema





            2)  Drink Three glasses of Water





WAIT 2-3 HOURS AND IF NO RESULTS





            3)  Take 1 tablespoon ( 15 cc) of CASTOR OIL





            4)  Drink 3 glasses of Water








STOP CONSTIPATING FOODS: Dairy, Bread, Cheese, Meat and Processed Foods








Daily: CHEW 4 DRIED PRUNES





F/U w/ PCP for Gastroenterology Evaluation





- My Orders


Last 24 Hours: 


My Active Orders





11/14/17 22:45


Sodium Chloride 0.9% [Normal Saline] 1,000 ml IV ASDIRECTED 





11/14/17 23:03


EKG Documentation Completion [RC] STAT 





11/14/17 23:33


CULTURE URINE [RM] Stat 





11/14/17 23:34


Ciprofloxacin in D5W [Cipro in D5W 400 MG/200 ML] 400 mg   Premix Bag 1 bag IV 

ONETIME 














- Assessment/Plan


Last 24 Hours: 


My Active Orders





11/14/17 22:45


Sodium Chloride 0.9% [Normal Saline] 1,000 ml IV ASDIRECTED 





11/14/17 23:03


EKG Documentation Completion [RC] STAT 





11/14/17 23:33


CULTURE URINE [RM] Stat 





11/14/17 23:34


Ciprofloxacin in D5W [Cipro in D5W 400 MG/200 ML] 400 mg   Premix Bag 1 bag IV 

ONETIME

## 2017-11-19 ENCOUNTER — HOSPITAL ENCOUNTER (EMERGENCY)
Dept: HOSPITAL 43 - DL.ED | Age: 62
Discharge: HOME | End: 2017-11-19
Payer: MEDICARE

## 2017-11-19 VITALS — DIASTOLIC BLOOD PRESSURE: 54 MMHG | SYSTOLIC BLOOD PRESSURE: 98 MMHG

## 2017-11-19 DIAGNOSIS — Z88.5: ICD-10-CM

## 2017-11-19 DIAGNOSIS — Z88.1: ICD-10-CM

## 2017-11-19 DIAGNOSIS — Z79.899: ICD-10-CM

## 2017-11-19 DIAGNOSIS — F17.210: ICD-10-CM

## 2017-11-19 DIAGNOSIS — Z79.82: ICD-10-CM

## 2017-11-19 DIAGNOSIS — Z88.0: ICD-10-CM

## 2017-11-19 DIAGNOSIS — I10: ICD-10-CM

## 2017-11-19 DIAGNOSIS — K59.01: Primary | ICD-10-CM

## 2017-11-19 PROCEDURE — 99283 EMERGENCY DEPT VISIT LOW MDM: CPT

## 2017-11-19 PROCEDURE — 74000: CPT

## 2017-11-19 NOTE — EDM.PDOC
ED HPI GENERAL MEDICAL PROBLEM





- General


Chief Complaint: Gastrointestinal Problem


Stated Complaint: UNABLE TO USE BATHROOM


Time Seen by Provider: 11/19/17 00:34


Source of Information: Reports: Patient


History Limitations: Reports: No Limitations





- History of Present Illness


INITIAL COMMENTS - FREE TEXT/NARRATIVE: 








long h/o constipation, was here few days ago for same. tried mag cit and eating 

more fruits but not getting better and feeling worse.





- Related Data


 Allergies











Allergy/AdvReac Type Severity Reaction Status Date / Time


 


ciprofloxacin Allergy  Itching Verified 11/19/17 00:21


 


codeine Allergy  Hives Verified 11/19/17 00:21


 


duloxetine HCl Allergy  Hives Verified 11/19/17 00:21





[From Cymbalta]     


 


gabapentin [From Neurontin] Allergy  Hives Verified 11/19/17 00:21


 


oxycodone [Oxycodone] Allergy  Hives Verified 11/19/17 00:21


 


Penicillins Allergy  Hives Verified 11/19/17 00:21











Home Meds: 


 Home Meds





ALPRAZolam [Xanax] 1 mg PO ASDIRECTED PRN 07/03/14 [History]


ALPRAZolam [Xanax] 2 mg PO QPM 07/03/14 [History]


Clopidogrel [Plavix] 75 mg PO DAILY 07/03/14 [History]


Levothyroxine [Synthroid] 25 mcg PO DAILY 07/03/14 [History]


Metoprolol Tartrate [Lopressor] 25 mg PO Q12HR 07/03/14 [History]


Sertraline [Zoloft] 200 mg PO DAILY 07/03/14 [History]


Simvastatin [Zocor] 40 mg PO BEDTIME 07/03/14 [History]


buPROPion HCl [buPROPion SR] 200 mg PO BID 07/03/14 [History]


Doxepin [SINEquan] 25 mg PO BEDTIME 03/12/17 [History]


Albuterol [IMW: Albuterol HFA] 8 gm IH Q6H PRN #8 gm 03/29/17 [Rx]


Doxycycline Monohydrate [IJD: Doxycycline Monohydrate] 100 mg PO .TWICE DAILY #

14 cap 03/29/17 [Rx]


predniSONE 10 mg PO .TAPER #7 tablet 03/29/17 [Rx]


Aspirin 81 mg PO DAILY 11/14/17 [History]











Past Medical History


HEENT History: Reports: Hard of Hearing, Impaired Vision, Otitis Media


Other HEENT History: infection to the right ear


Cardiovascular History: Reports: Bypass, High Cholesterol, Hypertension, Stents


Other Cardiovascular History: triple bypass and 1 stent to the heart


Gastrointestinal History: Reports: GERD


OB/GYN History: Reports: Pregnancy


Musculoskeletal History: Reports: Arthritis, Back Pain, Chronic


Psychiatric History: Reports: Anxiety, Depression


Endocrine/Metabolic History: Reports: Hyperthyroidism


Oncologic (Cancer) History: Reports: Other (See Below)


Other Oncologic History: right ear





- Infectious Disease History


Infectious Disease History: Reports: Chicken Pox, Measles, Mumps





- Past Surgical History


HEENT Surgical History: Reports: Other (See Below)


Cardiovascular Surgical History: Reports: Coronary Artery Bypass, Other (See 

Below)


Respiratory Surgical History: Reports: None


Dermatological Surgical History: Reports: None





Social & Family History





- Family History


Family Medical History: Noncontributory





- Tobacco Use


Smoking Status *Q: Current Every Day Smoker


Years of Tobacco use: 30


Packs/Tins Daily: 0.5


Used Tobacco, but Quit: No


Second Hand Smoke Exposure: Yes





- Caffeine Use


Caffeine Use: Reports: Coffee





- Alcohol Use


Days Per Week of Alcohol Use: 0





- Recreational Drug Use


Recreational Drug Use: No





ED ROS GENERAL





- Review of Systems


Review Of Systems: ROS reveals no pertinent complaints other than HPI.





ED EXAM, GI/ABD





- Physical Exam


Exam: See Below


Exam Limited By: No Limitations


General Appearance: Alert, WD/WN, Mild Distress, Other (discomfort)


Ears: Hearing Grossly Normal


Throat/Mouth: Normal Voice, No Airway Compromise


Head: Atraumatic


Neck: Non-Tender, Full Range of Motion


Respiratory/Chest: No Respiratory Distress


Cardiovascular: Regular Rate, Rhythm


GI/Abdominal Exam: Soft, Tender, Abnormal Bowel Sounds, Other (generalized 

discomfort, BS hyper.).  No: Guarding, Rigid, Rebound


Neurological: Alert, Oriented, Normal Cognition, Normal Gait, No Motor/Sensory 

Deficits


Psychiatric: Flat Affect, Tearful


Skin Exam: Warm, Dry, Normal Color


Lymphatic: No Adenopathy





Course





- Vital Signs


Last Recorded V/S: 


 Last Vital Signs











Temp  36.3 C   11/19/17 00:14


 


Pulse  44 L  11/19/17 00:14


 


Resp  18   11/19/17 00:14


 


BP  114/62   11/19/17 00:14


 


Pulse Ox  99   11/19/17 00:14














- Orders/Labs/Meds


Orders: 


 Active Orders 24 hr











 Category Date Time Status


 


 Enema [RC] ASDIRECTED Care  11/19/17 01:08 Active


 


 KUB [Abdomen 1V Flat] [CR] Urgent Exams  11/19/17 00:34 Taken


 


 Ondansetron [Zofran ODT] Med  11/19/17 02:38 Once





 4 mg PO ONETIME ONE   











Meds: 


Medications














Discontinued Medications














Generic Name Dose Route Start Last Admin





  Trade Name Octaviano  PRN Reason Stop Dose Admin


 


Dicyclomine HCl  20 mg  11/19/17 00:38  11/19/17 00:47





  Bentyl  IM  11/19/17 00:39  20 mg





  ONETIME ONE   Administration














- Re-Assessments/Exams


Free Text/Narrative Re-Assessment/Exam: 





11/19/17 02:39


re-xam; feeling better post stools. c/o some nausea.





Departure





- Departure


Time of Disposition: 02:39


Disposition: Home, Self-Care 01


Condition: Fair


Clinical Impression: 


 Constipation by delayed colonic transit








- Discharge Information


Instructions:  Constipation, Adult, Easy-to-Read


Forms:  ED Department Discharge


Additional Instructions: 


1) avoid solid foods next 48 hours


2) try warm prune juice


3) follow up with family doctor 


4) recheck as needed





- My Orders


Last 24 Hours: 


My Active Orders





11/19/17 00:34


KUB [Abdomen 1V Flat] [CR] Urgent 





11/19/17 01:08


Enema [RC] ASDIRECTED 





11/19/17 02:38


Ondansetron [Zofran ODT]   4 mg PO ONETIME ONE 














- Assessment/Plan


Last 24 Hours: 


My Active Orders





11/19/17 00:34


KUB [Abdomen 1V Flat] [CR] Urgent 





11/19/17 01:08


Enema [RC] ASDIRECTED 





11/19/17 02:38


Ondansetron [Zofran ODT]   4 mg PO ONETIME ONE

## 2017-12-15 NOTE — EKG
11/14/2017 - MERCEDES OLIVARES -

 

FINDINGS:  I reviewed the EKG and agree with the machine's reading.

 

DD:  12/15/2017 11:17:59

DT:  12/15/2017 11:29:31

[facility]

Job #:  [id number]

## 2018-01-12 ENCOUNTER — HOSPITAL ENCOUNTER (OUTPATIENT)
Dept: HOSPITAL 43 - DL.ENDO | Age: 63
Discharge: HOME | End: 2018-01-12
Attending: INTERNAL MEDICINE
Payer: MEDICARE

## 2018-01-12 VITALS — SYSTOLIC BLOOD PRESSURE: 122 MMHG | DIASTOLIC BLOOD PRESSURE: 66 MMHG

## 2018-01-12 DIAGNOSIS — K29.50: Primary | ICD-10-CM

## 2018-01-12 DIAGNOSIS — F17.200: ICD-10-CM

## 2018-01-12 DIAGNOSIS — I10: ICD-10-CM

## 2018-01-12 DIAGNOSIS — E78.5: ICD-10-CM

## 2018-01-12 DIAGNOSIS — F32.9: ICD-10-CM

## 2018-01-12 DIAGNOSIS — F41.1: ICD-10-CM

## 2018-01-12 DIAGNOSIS — I25.10: ICD-10-CM

## 2018-01-12 DIAGNOSIS — K25.9: ICD-10-CM

## 2018-01-12 DIAGNOSIS — M06.9: ICD-10-CM

## 2018-01-12 DIAGNOSIS — K44.9: ICD-10-CM

## 2018-01-12 PROCEDURE — 87077 CULTURE AEROBIC IDENTIFY: CPT

## 2018-01-12 PROCEDURE — 43239 EGD BIOPSY SINGLE/MULTIPLE: CPT

## 2018-01-12 NOTE — OR
DATE:  01/12/2018

 

PROCEDURE:  Esophagogastroduodenoscopy and multiple pinch biopsies.

 

INSTRUMENT USED:  [id number]-Q180 Olympus video panendoscope.

 

PREMEDICATIONS:  No oral topical anesthesia used.  Fentanyl 100 mcg intravenous,

Versed 2 mg intravenous.  The procedure was done under pulse oximetry, BP

recording, and cardiac monitor.

 

INDICATION:  The patient with persistent nausea, dyspepsia, abdominal pain, as

well as progressive weight loss, unexplained and not responsive to medical

measures.  Esophagogastroduodenoscopy is performed for detection of any active

erosive lesions, Hall esophagus and/or malignancy also under consideration,

H. pylori status to be determined.  Endoscopic hemostasis therapy if needed.

 

DESCRIPTION OF PROCEDURE:  The scope was passed with ease.  Adequate

visualization of the esophagus was made from proximal to distal areas.  No upper

esophageal lesions identified.  No distal esophageal stricture.  No uphill or

downhill esophageal varices.  No Anila-[place] tear.  No evidence of erosive

esophagitis by [place] criteria.  No esophageal polyp or tumor mass

identified.  Sliding hiatal hernia was noted.  No proximal gastric varices

noted.  Gastric fundus examination by retroflexion showed no polypoid lesions.

No gastric ulcer, malignant mass, or vascular ectasia identified.  Gastric

antral erosions were noted without bleeding from them.  Duodenal bulb showed no

ulcer.  Visualized second part of the duodenum was unremarkable.  Multiple pinch

biopsies were taken from the gastric antrum and proximal body and sent for

PyloriTek test for H. pylori, and if negative in an hour, tissue is to be sent

for histopathology.  No bleeding was noted from any of the visualized areas at

the completion of examination.  Photographs were taken of the duodenal bulb,

gastric antrum, fundus, and distal esophagus.

 

IMPRESSION:

1. Sliding hiatal hernia.

2. Gastric antral erosions.

The patient tolerated the procedure well.

 

DD:  01/12/2018 08:10:47

DT:  01/12/2018 08:55:38

[facility]

Job #:  [id number]

## 2019-02-14 ENCOUNTER — HOSPITAL ENCOUNTER (EMERGENCY)
Dept: HOSPITAL 43 - DL.ED | Age: 64
Discharge: HOME | End: 2019-02-14
Payer: MEDICARE

## 2019-02-14 VITALS — DIASTOLIC BLOOD PRESSURE: 73 MMHG | SYSTOLIC BLOOD PRESSURE: 128 MMHG

## 2019-02-14 DIAGNOSIS — F41.9: ICD-10-CM

## 2019-02-14 DIAGNOSIS — K80.20: Primary | ICD-10-CM

## 2019-02-14 DIAGNOSIS — K21.9: ICD-10-CM

## 2019-02-14 DIAGNOSIS — Z79.899: ICD-10-CM

## 2019-02-14 DIAGNOSIS — E78.00: ICD-10-CM

## 2019-02-14 DIAGNOSIS — I10: ICD-10-CM

## 2019-02-14 DIAGNOSIS — Z87.891: ICD-10-CM

## 2019-02-14 DIAGNOSIS — J44.9: ICD-10-CM

## 2019-02-14 DIAGNOSIS — K57.30: ICD-10-CM

## 2019-02-14 DIAGNOSIS — Z95.5: ICD-10-CM

## 2019-02-14 DIAGNOSIS — E03.9: ICD-10-CM

## 2019-02-14 DIAGNOSIS — Z88.1: ICD-10-CM

## 2019-02-14 DIAGNOSIS — Z79.01: ICD-10-CM

## 2019-02-14 DIAGNOSIS — Z88.5: ICD-10-CM

## 2019-02-14 DIAGNOSIS — E05.90: ICD-10-CM

## 2019-02-14 DIAGNOSIS — F32.9: ICD-10-CM

## 2019-02-14 LAB
ANION GAP SERPL CALC-SCNC: 15.7 MMOL/L
CHLORIDE SERPL-SCNC: 103 MMOL/L (ref 101–111)
SODIUM SERPL-SCNC: 136 MMOL/L (ref 135–145)

## 2019-02-14 PROCEDURE — 80053 COMPREHEN METABOLIC PANEL: CPT

## 2019-02-14 PROCEDURE — 84484 ASSAY OF TROPONIN QUANT: CPT

## 2019-02-14 PROCEDURE — 83880 ASSAY OF NATRIURETIC PEPTIDE: CPT

## 2019-02-14 PROCEDURE — 83605 ASSAY OF LACTIC ACID: CPT

## 2019-02-14 PROCEDURE — 81001 URINALYSIS AUTO W/SCOPE: CPT

## 2019-02-14 PROCEDURE — 36415 COLL VENOUS BLD VENIPUNCTURE: CPT

## 2019-02-14 PROCEDURE — 96374 THER/PROPH/DIAG INJ IV PUSH: CPT

## 2019-02-14 PROCEDURE — 87804 INFLUENZA ASSAY W/OPTIC: CPT

## 2019-02-14 PROCEDURE — 87040 BLOOD CULTURE FOR BACTERIA: CPT

## 2019-02-14 PROCEDURE — 83690 ASSAY OF LIPASE: CPT

## 2019-02-14 PROCEDURE — 74177 CT ABD & PELVIS W/CONTRAST: CPT

## 2019-02-14 PROCEDURE — 96361 HYDRATE IV INFUSION ADD-ON: CPT

## 2019-02-14 PROCEDURE — 99284 EMERGENCY DEPT VISIT MOD MDM: CPT

## 2019-02-14 PROCEDURE — 85025 COMPLETE CBC W/AUTO DIFF WBC: CPT

## 2019-02-14 PROCEDURE — 71045 X-RAY EXAM CHEST 1 VIEW: CPT

## 2019-02-14 PROCEDURE — 82150 ASSAY OF AMYLASE: CPT

## 2019-02-14 PROCEDURE — 93005 ELECTROCARDIOGRAM TRACING: CPT

## 2019-02-14 NOTE — EDM.PDOC
ED HPI GENERAL MEDICAL PROBLEM





- General


Chief Complaint: Gastrointestinal Problem


Stated Complaint: STOMACH FLU?


Time Seen by Provider: 02/14/19 19:31


Source of Information: Reports: Patient, Family, RN, RN Notes Reviewed


History Limitations: Reports: No Limitations





- History of Present Illness


INITIAL COMMENTS - FREE TEXT/NARRATIVE: 


Pt to ER with c/o chills, sweats, nausea for the past 8-9 days. She admits she 

has seldom vomiting, and has had left sided chest heaviness at least 1 time a 

day for quite a while (the past few weeks). She has been seen by her PCP for 

the same complaints. She denies diarrhea, SOB. She does admit to history of 

constipation. Patient also states she stopped taking an antidepressant about 2 

weeks ago, and is unsure if she weaned off of it correctly. Patient states she 

has a long history of anxiety and depression. 


Other c/o include urinary symptoms of voiding small amounts, intermittently 

while toileting. Denies blood in urine, emesis, or stool. She states she is 

aware of gallstones. She has had an endoscopy, but it was a few years ago, at 

which time, ulcers were found. 





Onset: Gradual


  ** Anterior Chest


Pain Score (Numeric/FACES): 4





- Related Data


 Allergies











Allergy/AdvReac Type Severity Reaction Status Date / Time


 


ciprofloxacin Allergy  Itching Verified 02/14/19 19:58


 


codeine AdvReac  Nausea and Verified 02/14/19 19:58





   Vomiting  


 


duloxetine HCl AdvReac  Nausea and Verified 02/14/19 19:58





[From Cymbalta]   Vomiting  


 


gabapentin [From Neurontin] AdvReac  Nausea and Verified 02/14/19 19:58





   Vomiting  


 


oxycodone [Oxycodone] AdvReac  Nausea and Verified 02/14/19 19:58





   Vomiting  


 


Penicillins AdvReac  Nausea and Verified 02/14/19 19:58





   Vomiting  


 


ZELJANZ Allergy  Other Uncoded 02/14/19 19:58











Home Meds: 


 Home Meds





ALPRAZolam [Xanax] 2 mg PO BEDTIME PRN 07/03/14 [History]


Clopidogrel [Plavix] 75 mg PO DAILY 07/03/14 [History]


Levothyroxine [Synthroid] 25 mcg PO DAILY 07/03/14 [History]


Metoprolol Tartrate [Lopressor] 25 mg PO Q12HR 07/03/14 [History]


Simvastatin [Zocor] 40 mg PO BEDTIME 07/03/14 [History]


buPROPion HCl [buPROPion SR] 200 mg PO BID 07/03/14 [History]


Aspirin 162 mg PO DAILY 11/14/17 [History]


Acetaminophen 1,000 mg PO ASDIRECTED PRN 01/11/18 [History]


Acetaminophen/Diphenhydramine [Acetadryl 500-25 MG] 1 tab PO BEDTIME 01/11/18 [

History]


Nitroglycerin [Nitrostat] 1 tab SL ASDIRECTED PRN 01/11/18 [History]


Ubidecarenone [Coenzyme Q-10] 1 cap PO ASDIRECTED 01/11/18 [History]


Vitamin B Complex Vit C No.4 [Super B Complex] 1 tab PO DAILY 01/11/18 [History]


Biotin 2 tab PO DAILY 10/09/18 [History]


Ketorolac [Acular 0.5% Ophth Soln] 1 drop EYELF ASDIRECTED 10/09/18 [History]


Moxifloxacin HCl [Moxifloxacin] 1 drop EYERT ASDIRECTED 10/09/18 [History]


Venlafaxine [Effexor XR] 75 mg PO DAILY 10/09/18 [History]











Past Medical History


HEENT History: Reports: Cataract, Hard of Hearing, Impaired Vision, Otitis Media


Other HEENT History: infection to the right ear


Cardiovascular History: Reports: Bypass, High Cholesterol, Hypertension, Stents


Other Cardiovascular History: triple bypass and 1 stent to the heart


Respiratory History: Reports: COPD


Gastrointestinal History: Reports: Chronic Constipation, GERD


Genitourinary History: Reports: UTI, Recurrent


OB/GYN History: Reports: Pregnancy


Musculoskeletal History: Reports: Arthritis, Back Pain, Chronic, Osteoarthritis


Neurological History: Reports: Concussion


Psychiatric History: Reports: Anxiety, Depression


Endocrine/Metabolic History: Reports: Hyperthyroidism, Hypothyroidism


Hematologic History: Reports: None


Immunologic History: Reports: None


Oncologic (Cancer) History: Reports: Other (See Below)


Other Oncologic History: right ear


Dermatologic History: Reports: None





- Infectious Disease History


Infectious Disease History: Reports: Chicken Pox, Measles, Mumps





- Past Surgical History


Head Surgeries/Procedures: Reports: None


HEENT Surgical History: Reports: Cataract Surgery, Other (See Below)


Other HEENT Surgeries/Procedures: right ear surgery


Cardiovascular Surgical History: Reports: Coronary Artery Bypass, Other (See 

Below)


Other Cardiovascular Surgeries/Procedures: stents


Respiratory Surgical History: Reports: None


GI Surgical History: Reports: Colonoscopy, EGD


Female  Surgical History: Reports: Breast Biopsy


Endocrine Surgical History: Reports: None


Neurological Surgical History: Reports: None


Musculoskeletal Surgical History: Reports: None


Oncologic Surgical History: Reports: None


Dermatological Surgical History: Reports: None





Social & Family History





- Family History


Family Medical History: Noncontributory





- Tobacco Use


Smoking Status *Q: Former Smoker


Used Tobacco, but Quit: No





- Caffeine Use


Caffeine Use: Reports: Coffee, Soda, Tea


Other Caffeine Use: 1 CUP COFFEE SOME DAYS.  OCCASIONALLY SPRITE SODA POP





- Recreational Drug Use


Recreational Drug Use: No





ED ROS GENERAL





- Review of Systems


Review Of Systems: ROS reveals no pertinent complaints other than HPI.





ED EXAM, GENERAL





- Physical Exam


Exam: See Below


Exam Limited By: No Limitations


General Appearance: Alert, WD/WN, Mild Distress


Eye Exam: Bilateral Eye: EOMI, Normal Inspection


Ears: Normal External Exam, Hearing Grossly Normal


Nose: Normal Inspection


Throat/Mouth: Normal Inspection, Normal Voice, No Airway Compromise


Head: Atraumatic, Normocephalic


Neck: Normal Inspection, Supple, Non-Tender, Full Range of Motion


Respiratory/Chest: No Respiratory Distress, Lungs Clear, Normal Breath Sounds, 

No Accessory Muscle Use, Chest Non-Tender


Cardiovascular: Normal Peripheral Pulses, No Edema, No Gallop, No JVD, No Murmur

, No Rub, Bradycardia


Peripheral Pulses: 2+: Radial (L), Radial (R)


GI/Abdominal: Normal Bowel Sounds, Soft, Tender (RUQ with palpation)


 (Female) Exam: Deferred


Rectal (Female) Exam: Deferred


Back Exam: Normal Inspection, Full Range of Motion


Extremities: Normal Inspection, Normal Range of Motion, Non-Tender, No Pedal 

Edema, Normal Capillary Refill


Neurological: Alert, Oriented, CN II-XII Intact, Normal Cognition, Normal Gait, 

Normal Reflexes, No Motor/Sensory Deficits


Psychiatric: Anxious, Flat Affect


Skin Exam: Warm, Dry, Intact, Normal Color, No Rash


Lymphatic: No Adenopathy





EKG INTERPRETATION


EKG Date: 02/14/19


Time: 19:07


Rhythm: Other (bradycardia)


Rate (Beats/Min): 42


Comparison: Change From Previous EKG





Course





- Vital Signs


Last Recorded V/S: 


 Last Vital Signs











Temp  98.0 F   02/14/19 20:42


 


Pulse  45 L  02/14/19 20:42


 


Resp  14   02/14/19 20:42


 


BP  128/73   02/14/19 20:42


 


Pulse Ox  100   02/14/19 20:42














- Orders/Labs/Meds


Orders: 


 Active Orders 24 hr











 Category Date Time Status


 


 EKG Documentation Completion [RC] STAT Care  02/14/19 19:32 Active


 


 Peripheral IV Care [RC] .AS DIRECTED Care  02/14/19 19:33 Active


 


 CULTURE BLOOD [BC] Stat Lab  02/14/19 19:30 Received


 


 CULTURE BLOOD [BC] Stat Lab  02/14/19 20:20 Received


 


 Blood Culture x2 Reflex Set [OM.PC] Stat Oth  02/14/19 19:33 Ordered


 


 Peripheral IV Insertion Adult [OM.PC] Stat Oth  02/14/19 19:31 Ordered











Labs: 


 Laboratory Tests











  02/14/19 02/14/19 02/14/19 Range/Units





  18:58 19:30 19:30 


 


WBC   6.6   (5.0-10.0)  10^3/uL


 


RBC   3.76 L   (4.2-5.4)  10^6/uL


 


Hgb   11.7 L   (12.0-16.0)  g/dL


 


Hct   35.8 L   (37.0-47.0)  %


 


MCV   95.2   ()  fL


 


MCH   31.1   (27.0-34.0)  pg


 


MCHC   32.7 L   (33.0-35.0)  g/dL


 


Plt Count   244   (150-450)  10^3/uL


 


Neut % (Auto)   37.9 L   (42.2-75.2)  %


 


Lymph % (Auto)   52.2 H   (20.5-50.1)  %


 


Mono % (Auto)   7.6   (2-8)  %


 


Eos % (Auto)   2.1   (1.0-3.0)  %


 


Baso % (Auto)   0.2   (0.0-1.0)  %


 


Sodium    136  (135-145)  mmol/L


 


Potassium    4.7  (3.6-5.0)  mmol/L


 


Chloride    103  (101-111)  mmol/L


 


Carbon Dioxide    22.0  (21.0-31.0)  mmol/L


 


Anion Gap    15.7  


 


BUN    13  (7-18)  mg/dL


 


Creatinine    1.2  (0.6-1.3)  mg/dL


 


Est Cr Clr Drug Dosing    42.62  mL/min


 


Estimated GFR (MDRD)    45  


 


BUN/Creatinine Ratio    10.83  


 


Glucose    93  ()  mg/dL


 


Lactic Acid     (0.5-2.2)  mmol/L


 


Calcium    9.1  (8.4-10.2)  mg/dl


 


Total Bilirubin    0.7  (0.2-1.0)  mg/dL


 


AST    20  (10-42)  IU/L


 


ALT    14  (10-60)  IU/L


 


Alkaline Phosphatase    80  ()  IU/L


 


Troponin I    < 0.02  (0.00-0.02)  ng/ml


 


B-Natriuretic Peptide    61  (0-100)  pg/ml


 


Total Protein    7.1  (6.7-8.2)  g/dl


 


Albumin    3.9  (3.2-5.5)  g/dl


 


Globulin    3.2  


 


Albumin/Globulin Ratio    1.22  


 


Amylase     ()  U/L


 


Lipase     (22-51)  U/L


 


Urine Color  Yellow    (YELLOW)  


 


Urine Appearance  Slightly cloudy    (CLEAR)  


 


Urine pH  5.0    (5.0-9.0)  


 


Ur Specific Gravity  >= 1.030    (1.005-1.030)  


 


Urine Protein  Negative    (NEGATIVE)  


 


Urine Glucose (UA)  Negative    (NEGATIVE)  


 


Urine Ketones  Trace H    (NEGATIVE)  


 


Urine Occult Blood  Trace-intact H    (NEGATIVE)  


 


Urine Nitrite  Negative    (NEGATIVE)  


 


Urine Bilirubin  Negative    (NEGATIVE)  


 


Urine Urobilinogen  0.2    (0.2-1.0)  mg/dL


 


Ur Leukocyte Esterase  Negative    (NEGATIVE)  


 


Urine RBC  10-20 H    /HPF


 


Urine WBC  0-5    (0-5/HPF)  /HPF


 


Ur Epithelial Cells  Few    /HPF


 


Amorphous Sediment  Rare    (0/HPF)  /HPF


 


Urine Bacteria  Rare    (0-FEW/HPF)  /HPF


 


Urine Mucus  Moderate H    /LPF














  02/14/19 02/14/19 Range/Units





  19:30 20:20 


 


WBC    (5.0-10.0)  10^3/uL


 


RBC    (4.2-5.4)  10^6/uL


 


Hgb    (12.0-16.0)  g/dL


 


Hct    (37.0-47.0)  %


 


MCV    ()  fL


 


MCH    (27.0-34.0)  pg


 


MCHC    (33.0-35.0)  g/dL


 


Plt Count    (150-450)  10^3/uL


 


Neut % (Auto)    (42.2-75.2)  %


 


Lymph % (Auto)    (20.5-50.1)  %


 


Mono % (Auto)    (2-8)  %


 


Eos % (Auto)    (1.0-3.0)  %


 


Baso % (Auto)    (0.0-1.0)  %


 


Sodium    (135-145)  mmol/L


 


Potassium    (3.6-5.0)  mmol/L


 


Chloride    (101-111)  mmol/L


 


Carbon Dioxide    (21.0-31.0)  mmol/L


 


Anion Gap    


 


BUN    (7-18)  mg/dL


 


Creatinine    (0.6-1.3)  mg/dL


 


Est Cr Clr Drug Dosing    mL/min


 


Estimated GFR (MDRD)    


 


BUN/Creatinine Ratio    


 


Glucose    ()  mg/dL


 


Lactic Acid   0.9  (0.5-2.2)  mmol/L


 


Calcium    (8.4-10.2)  mg/dl


 


Total Bilirubin    (0.2-1.0)  mg/dL


 


AST    (10-42)  IU/L


 


ALT    (10-60)  IU/L


 


Alkaline Phosphatase    ()  IU/L


 


Troponin I    (0.00-0.02)  ng/ml


 


B-Natriuretic Peptide    (0-100)  pg/ml


 


Total Protein    (6.7-8.2)  g/dl


 


Albumin    (3.2-5.5)  g/dl


 


Globulin    


 


Albumin/Globulin Ratio    


 


Amylase  64   ()  U/L


 


Lipase  34   (22-51)  U/L


 


Urine Color    (YELLOW)  


 


Urine Appearance    (CLEAR)  


 


Urine pH    (5.0-9.0)  


 


Ur Specific Gravity    (1.005-1.030)  


 


Urine Protein    (NEGATIVE)  


 


Urine Glucose (UA)    (NEGATIVE)  


 


Urine Ketones    (NEGATIVE)  


 


Urine Occult Blood    (NEGATIVE)  


 


Urine Nitrite    (NEGATIVE)  


 


Urine Bilirubin    (NEGATIVE)  


 


Urine Urobilinogen    (0.2-1.0)  mg/dL


 


Ur Leukocyte Esterase    (NEGATIVE)  


 


Urine RBC    /HPF


 


Urine WBC    (0-5/HPF)  /HPF


 


Ur Epithelial Cells    /HPF


 


Amorphous Sediment    (0/HPF)  /HPF


 


Urine Bacteria    (0-FEW/HPF)  /HPF


 


Urine Mucus    /LPF











Meds: 


Medications














Discontinued Medications














Generic Name Dose Route Start Last Admin





  Trade Name Freq  PRN Reason Stop Dose Admin


 


Sodium Chloride  1,000 mls @ 999 mls/hr  02/14/19 20:35  02/14/19 20:46





  Normal Saline  IV  02/14/19 21:35  999 mls/hr





  .BOLUS ONE   Administration





     





     





     





     


 


Iopamidol  75 ml  02/14/19 20:43  02/14/19 20:54





  Isovue-300 (61%)  IVPUSH  02/14/19 20:44  75 ml





  ONETIME ONE   Administration





     





     





     





     


 


Metoclopramide HCl  Confirm  02/14/19 22:13  02/14/19 22:24





  Reglan  Administered  02/14/19 22:14  Not Given





  Dose   





  30 mg   





  .ROUTE   





  .STK-MED ONE   





     





     





     





     


 


Ondansetron HCl  4 mg  02/14/19 20:35  02/14/19 20:46





  Zofran  IV  02/14/19 20:36  4 mg





  ONETIME ONE   Administration





     





     





     





     


 


Sodium Chloride  10 ml  02/14/19 19:31  02/14/19 19:35





  Saline Flush  FLUSH   10 ml





  ASDIRECTED PRN   Administration





  Keep Vein Open   





     





     





     














- Radiology Interpretation


Free Text/Narrative:: 


Chest xray:


FINDINGS:


Lungs: Unremarkable. No consolidation.


Pleural space: Unremarkable. No pleural effusion. No pneumothorax.


Heart/Mediastinum: Unremarkable. No cardiomegaly.


Bones/joints: Midline sternotomy is noted. There is old left rib fracture.


IMPRESSION:


No acute process


Thank you for allowing us to participate in the care of your patient.


Dictated and Authenticated by: Orion Santacruz MD


02/14/2019 8:17 PM Central Time ([place] & [place])








CT of abdomen/Pelvis with contrast:


FINDINGS:


Lower thorax: No acute findings.


ABDOMEN:


Liver: Normal. No mass.


Gallbladder and bile ducts: There are several tiny gallstones.


Pancreas: Normal. No ductal dilation.


Spleen: Normal. No splenomegaly.


Adrenals: Normal. No mass.


Kidneys and ureters: Normal. No hydronephrosis.


Stomach and bowel: There is small hiatal hernia. There are scattered 

diverticuli distal colon.


Pericolonic fat planes are preserved. Bowel gas pattern is nonobstructive.


Appendix: No evidence of appendicitis.


PELVIS:


Bladder: Unremarkable as visualized.


Reproductive: Unremarkable as visualized.


ABDOMEN and PELVIS:


Intraperitoneal space: Normal. No free air. No significant fluid collection.


Bones/joints: No acute fracture. No dislocation.


Soft tissues: Unremarkable.


Vasculature: Normal. No abdominal aortic aneurysm.


Lymph nodes: Normal. No enlarged lymph nodes.


IMPRESSION:


1. Cholelithiasis


2. Distal colonic diverticulosis.


Thank you for allowing us to participate in the care of your patient.


Dictated and Authenticated by: Orion Santacruz MD


02/14/2019 9:39 PM Central Time ([place] & [place])





See rad report








Departure





- Departure


Time of Disposition: 22:09


Disposition: Home, Self-Care 01


Condition: Fair


Clinical Impression: 


 Nausea





Cholelithiasis


Qualifiers:


 Cholelithiasis location: gallbladder Cholecystitis presence: without 

cholecystitis Biliary obstruction: without biliary obstruction Qualified Code(s)

: K80.20 - Calculus of gallbladder without cholecystitis without obstruction





Diverticulosis


Qualifiers:


 Diverticulosis site: diverticulosis of large intestine Diverticulosis bleeding

: diverticulosis without bleeding Qualified Code(s): K57.30 - Diverticulosis of 

large intestine without perforation or abscess without bleeding








- Discharge Information


*PRESCRIPTION DRUG MONITORING PROGRAM REVIEWED*: No


*COPY OF PRESCRIPTION DRUG MONITORING REPORT IN PATIENT ROHAN: No


Instructions:  Cholelithiasis, Easy-to-Read, Nausea and Vomiting, Adult, Easy-to

-Read, Nausea, Adult, Easy-to-Read


Referrals: 


Merari Griffin MD [Primary Care Provider] - 


Forms:  ED Department Discharge


Additional Instructions: 


RX: Reglan


Follow up with your primary care facility regarding antidepressants, nausea








- My Orders


Last 24 Hours: 


My Active Orders





02/14/19 19:30


CULTURE BLOOD [BC] Stat 





02/14/19 19:31


Peripheral IV Insertion Adult [OM.PC] Stat 





02/14/19 19:32


EKG Documentation Completion [RC] STAT 





02/14/19 19:33


Peripheral IV Care [RC] .AS DIRECTED 


Blood Culture x2 Reflex Set [OM.PC] Stat 





02/14/19 20:20


CULTURE BLOOD [BC] Stat 














- Assessment/Plan


Last 24 Hours: 


My Active Orders





02/14/19 19:30


CULTURE BLOOD [BC] Stat 





02/14/19 19:31


Peripheral IV Insertion Adult [OM.PC] Stat 





02/14/19 19:32


EKG Documentation Completion [RC] STAT 





02/14/19 19:33


Peripheral IV Care [RC] .AS DIRECTED 


Blood Culture x2 Reflex Set [OM.PC] Stat 





02/14/19 20:20


CULTURE BLOOD [BC] Stat

## 2019-02-16 ENCOUNTER — HOSPITAL ENCOUNTER (EMERGENCY)
Dept: HOSPITAL 43 - DL.ED | Age: 64
Discharge: HOME | End: 2019-02-16
Payer: MEDICARE

## 2019-02-16 VITALS — DIASTOLIC BLOOD PRESSURE: 72 MMHG | SYSTOLIC BLOOD PRESSURE: 146 MMHG

## 2019-02-16 DIAGNOSIS — F17.210: ICD-10-CM

## 2019-02-16 DIAGNOSIS — F32.9: Primary | ICD-10-CM

## 2019-02-16 DIAGNOSIS — Z88.1: ICD-10-CM

## 2019-02-16 DIAGNOSIS — Z88.0: ICD-10-CM

## 2019-02-16 DIAGNOSIS — Z88.8: ICD-10-CM

## 2019-02-16 DIAGNOSIS — F41.9: ICD-10-CM

## 2019-02-16 DIAGNOSIS — Z88.5: ICD-10-CM

## 2019-02-16 DIAGNOSIS — Z79.899: ICD-10-CM

## 2019-02-16 DIAGNOSIS — J44.9: ICD-10-CM

## 2019-02-16 LAB
ANION GAP SERPL CALC-SCNC: 14.5 MMOL/L
APAP SERPL-SCNC: < 10 UG/ML
CHLORIDE SERPL-SCNC: 98 MMOL/L (ref 101–111)
SODIUM SERPL-SCNC: 132 MMOL/L (ref 135–145)

## 2019-02-16 PROCEDURE — 80305 DRUG TEST PRSMV DIR OPT OBS: CPT

## 2019-02-16 PROCEDURE — 80053 COMPREHEN METABOLIC PANEL: CPT

## 2019-02-16 PROCEDURE — 99285 EMERGENCY DEPT VISIT HI MDM: CPT

## 2019-02-16 PROCEDURE — G0480 DRUG TEST DEF 1-7 CLASSES: HCPCS

## 2019-02-16 PROCEDURE — 81001 URINALYSIS AUTO W/SCOPE: CPT

## 2019-02-16 PROCEDURE — 85025 COMPLETE CBC W/AUTO DIFF WBC: CPT

## 2019-02-16 PROCEDURE — 36415 COLL VENOUS BLD VENIPUNCTURE: CPT

## 2019-02-16 NOTE — EDM.PDOCBH
Scribed by Tasha Luque 02/16/19 1827 for Abdirashid Nevarez PA





ED HPI GENERAL MEDICAL PROBLEM





- General


Chief Complaint: Behavioral/Psych


Stated Complaint: SICKNESS ,STOMACH FLU


Time Seen by Provider: 02/16/19 16:30


Source of Information: Reports: Patient, RN, RN Notes Reviewed


History Limitations: Reports: No Limitations





- History of Present Illness


INITIAL COMMENTS - FREE TEXT/NARRATIVE: 


Patient states she is tired of it. She was an antidepressant for 8 months. 

[facility] took the patient off medication due to chalk taste. They 

started at 75mg for half to 1 week and then stop. Now she has hot flushing cold

, sweats, nausea and increased depression. She is on Wellbutrin but called 

[facility] 9left message 2 weeks ago) and no one returned her call. 

She has suicide thoughts. She got irate with . She has had previous 

suicide attempts. She has also had increasing shaking and quivering inside. She 

is not eating. 


Onset: Gradual


Duration: Constant


Location: Reports: Generalized


Severity: Moderate


Improves with: Reports: None


Worsens with: Reports: None


Associated Symptoms: Reports: No Other Symptoms





- Related Data


 Allergies











Allergy/AdvReac Type Severity Reaction Status Date / Time


 


ciprofloxacin Allergy  Itching Verified 02/16/19 15:12


 


codeine AdvReac  Nausea and Verified 02/16/19 15:12





   Vomiting  


 


duloxetine HCl AdvReac  Nausea and Verified 02/16/19 15:12





[From Cymbalta]   Vomiting  


 


gabapentin [From Neurontin] AdvReac  Nausea and Verified 02/16/19 15:12





   Vomiting  


 


oxycodone [Oxycodone] AdvReac  Nausea and Verified 02/16/19 15:12





   Vomiting  


 


Penicillins AdvReac  Nausea and Verified 02/16/19 15:12





   Vomiting  


 


ZELJANZ Allergy  Other Uncoded 02/16/19 15:12











Home Meds: 


 Home Meds





ALPRAZolam [Xanax] 2 mg PO BEDTIME PRN 07/03/14 [History]


Clopidogrel [Plavix] 75 mg PO DAILY 07/03/14 [History]


Levothyroxine [Synthroid] 25 mcg PO DAILY 07/03/14 [History]


Metoprolol Tartrate [Lopressor] 25 mg PO Q12HR 07/03/14 [History]


Simvastatin [Zocor] 40 mg PO BEDTIME 07/03/14 [History]


buPROPion HCl [buPROPion SR] 200 mg PO BID 07/03/14 [History]


Aspirin 162 mg PO DAILY 11/14/17 [History]


Acetaminophen 1,000 mg PO ASDIRECTED PRN 01/11/18 [History]


Acetaminophen/Diphenhydramine [Acetadryl 500-25 MG] 1 tab PO BEDTIME 01/11/18 [

History]


Nitroglycerin [Nitrostat] 1 tab SL ASDIRECTED PRN 01/11/18 [History]


Moxifloxacin HCl [Moxifloxacin] 1 drop EYERT ASDIRECTED 10/09/18 [History]











Past Medical History


HEENT History: Reports: Cataract, Hard of Hearing, Impaired Vision, Otitis Media


Other HEENT History: infection to the right ear


Cardiovascular History: Reports: Bypass, High Cholesterol, Hypertension, Stents


Other Cardiovascular History: triple bypass and 1 stent to the heart


Respiratory History: Reports: COPD


Gastrointestinal History: Reports: Chronic Constipation, GERD


Genitourinary History: Reports: UTI, Recurrent


OB/GYN History: Reports: Pregnancy


Musculoskeletal History: Reports: Arthritis, Back Pain, Chronic, Osteoarthritis


Neurological History: Reports: Concussion


Psychiatric History: Reports: Anxiety, Depression


Endocrine/Metabolic History: Reports: Hyperthyroidism, Hypothyroidism


Hematologic History: Reports: None


Immunologic History: Reports: None


Oncologic (Cancer) History: Reports: Other (See Below)


Other Oncologic History: right ear


Dermatologic History: Reports: None





- Infectious Disease History


Infectious Disease History: Reports: Chicken Pox, Measles, Mumps





- Past Surgical History


Head Surgeries/Procedures: Reports: None


HEENT Surgical History: Reports: Cataract Surgery, Other (See Below)


Other HEENT Surgeries/Procedures: right ear surgery


Cardiovascular Surgical History: Reports: Coronary Artery Bypass, Other (See 

Below)


Other Cardiovascular Surgeries/Procedures: stents


Respiratory Surgical History: Reports: None


GI Surgical History: Reports: Colonoscopy, EGD


Female  Surgical History: Reports: Breast Biopsy


Endocrine Surgical History: Reports: None


Neurological Surgical History: Reports: None


Musculoskeletal Surgical History: Reports: None


Oncologic Surgical History: Reports: None


Dermatological Surgical History: Reports: None





Social & Family History





- Family History


Family Medical History: Noncontributory





- Tobacco Use


Smoking Status *Q: Current Every Day Smoker


Years of Tobacco use: 50


Packs/Tins Daily: 1


Second Hand Smoke Exposure: No





- Caffeine Use


Caffeine Use: Reports: Coffee


Other Caffeine Use: 1 CUP COFFEE SOME DAYS.  OCCASIONALLY SPRITE SODA POP





- Recreational Drug Use


Recreational Drug Use: No





ED ROS GENERAL





- Review of Systems


Review Of Systems: ROS reveals no pertinent complaints other than HPI.





ED EXAM, BEHAVIORAL HEALTH





- Physical Exam


Exam: See Below


Exam Limited By: No Limitations


General Appearance: Anxious


Eye Exam: Bilateral Eye: EOMI, Normal Inspection, PERRL


Ears: Normal External Exam, Normal Canal, Hearing Grossly Normal, Normal TMs


Nose: Normal Inspection, Normal Mucosa, No Blood


Throat/Mouth: Normal Inspection, Normal Lips, Normal Teeth, Normal Gums, Normal 

Oropharynx, Normal Voice, No Airway Compromise


Head: Atraumatic, Normocephalic


Neck: Normal Inspection, Supple, Non-Tender, Full Range of Motion


Respiratory/Chest: No Respiratory Distress, Lungs Clear, Normal Breath Sounds, 

No Accessory Muscle Use, Chest Non-Tender


Cardiovascular: Normal Peripheral Pulses, Regular Rate, Rhythm, No Edema, No 

Gallop, No JVD, No Murmur, No Rub


GI/Abdominal: Normal Bowel Sounds, Soft, Non-Tender, No Organomegaly, No 

Distention, No Abnormal Bruit, No Mass


 (Female) Exam: Deferred


Rectal (Female) Exam: Deferred


Back Exam: Normal Inspection, Full Range of Motion, NT


Extremities: Normal Inspection, Normal Range of Motion, Non-Tender, Normal 

Capillary Refill, No Pedal Edema


Neurological: Alert, No Motor/Sensory Deficits, Oriented x 3


Psychiatric: Suicidal Thoughts, Other (anxious)


Skin Exam: Warm, Dry, Intact, Normal color, No rash





COURSE, BEHAVIORAL HEALTH COMP





- Course


Vital Signs: 


 Last Vital Signs











Temp  36.3 C   02/16/19 16:52


 


Pulse  52 L  02/16/19 16:52


 


Resp  16   02/16/19 16:52


 


BP  146/72 H  02/16/19 16:52


 


Pulse Ox  100   02/16/19 16:52











Orders, Labs, Meds: 





 Laboratory Tests











  02/16/19 02/16/19 02/16/19 Range/Units





  16:53 16:53 16:53 


 


WBC   7.1   (5.0-10.0)  10^3/uL


 


RBC   3.98 L   (4.2-5.4)  10^6/uL


 


Hgb   12.4   (12.0-16.0)  g/dL


 


Hct   37.6   (37.0-47.0)  %


 


MCV   94.5   ()  fL


 


MCH   31.2   (27.0-34.0)  pg


 


MCHC   33.0   (33.0-35.0)  g/dL


 


Plt Count   243   (150-450)  10^3/uL


 


Neut % (Auto)   53.7   (42.2-75.2)  %


 


Lymph % (Auto)   39.8   (20.5-50.1)  %


 


Mono % (Auto)   5.5   (2-8)  %


 


Eos % (Auto)   0.7 L   (1.0-3.0)  %


 


Baso % (Auto)   0.3   (0.0-1.0)  %


 


Sodium    132 L  (135-145)  mmol/L


 


Potassium    4.5  (3.6-5.0)  mmol/L


 


Chloride    98 L  (101-111)  mmol/L


 


Carbon Dioxide    24.0  (21.0-31.0)  mmol/L


 


Anion Gap    14.5  


 


BUN    13  (7-18)  mg/dL


 


Creatinine    1.3  (0.6-1.3)  mg/dL


 


Est Cr Clr Drug Dosing    39.34  mL/min


 


Estimated GFR (MDRD)    41  


 


BUN/Creatinine Ratio    10.00  


 


Glucose    116 H  ()  mg/dL


 


Calcium    9.5  (8.4-10.2)  mg/dl


 


Total Bilirubin    0.6  (0.2-1.0)  mg/dL


 


AST    22  (10-42)  IU/L


 


ALT    15  (10-60)  IU/L


 


Alkaline Phosphatase    87  ()  IU/L


 


Total Protein    7.9  (6.7-8.2)  g/dl


 


Albumin    4.3  (3.2-5.5)  g/dl


 


Globulin    3.6  


 


Albumin/Globulin Ratio    1.19  


 


Urine Color     (YELLOW)  


 


Urine Appearance     (CLEAR)  


 


Urine pH     (5.0-9.0)  


 


Ur Specific Gravity     (1.005-1.030)  


 


Urine Protein     (NEGATIVE)  


 


Urine Glucose (UA)     (NEGATIVE)  


 


Urine Ketones     (NEGATIVE)  


 


Urine Occult Blood     (NEGATIVE)  


 


Urine Nitrite     (NEGATIVE)  


 


Urine Bilirubin     (NEGATIVE)  


 


Urine Urobilinogen     (0.2-1.0)  mg/dL


 


Ur Leukocyte Esterase     (NEGATIVE)  


 


Urine RBC     /HPF


 


Urine WBC     (0-5/HPF)  /HPF


 


Ur Epithelial Cells     /HPF


 


Amorphous Sediment     (0/HPF)  /HPF


 


Urine Bacteria     (0-FEW/HPF)  /HPF


 


Urine Mucus     /LPF


 


Urinalysis Comment     


 


Salicylates  < 4    mg/dL


 


Urine Opiates Screen     (NEGATIVE)  


 


Ur Oxycodone Screen     (NEGATIVE)  


 


Urine Methadone Screen     (NEGATIVE)  


 


Acetaminophen  < 10    ug/mL


 


Ur Barbiturates Screen     (NEGATIVE)  


 


U Tricyclic Antidepress     (NEGATIVE)  


 


Ur Phencyclidine Scrn     (NEGATIVE)  


 


Ur Amphetamine Screen     (NEGATIVE)  


 


U Methamphetamines Scrn     (NEGATIVE)  


 


Urine MDMA Screen     (NEGATIVE)  


 


U Benzodiazepines Scrn     (NEGATIVE)  


 


Urine Cocaine Screen     (NEGATIVE)  


 


U Marijuana (THC) Screen     (NEGATIVE)  


 


Ethyl Alcohol  < 5    mg/dL














  02/16/19 02/16/19 Range/Units





  17:00 17:00 


 


WBC    (5.0-10.0)  10^3/uL


 


RBC    (4.2-5.4)  10^6/uL


 


Hgb    (12.0-16.0)  g/dL


 


Hct    (37.0-47.0)  %


 


MCV    ()  fL


 


MCH    (27.0-34.0)  pg


 


MCHC    (33.0-35.0)  g/dL


 


Plt Count    (150-450)  10^3/uL


 


Neut % (Auto)    (42.2-75.2)  %


 


Lymph % (Auto)    (20.5-50.1)  %


 


Mono % (Auto)    (2-8)  %


 


Eos % (Auto)    (1.0-3.0)  %


 


Baso % (Auto)    (0.0-1.0)  %


 


Sodium    (135-145)  mmol/L


 


Potassium    (3.6-5.0)  mmol/L


 


Chloride    (101-111)  mmol/L


 


Carbon Dioxide    (21.0-31.0)  mmol/L


 


Anion Gap    


 


BUN    (7-18)  mg/dL


 


Creatinine    (0.6-1.3)  mg/dL


 


Est Cr Clr Drug Dosing    mL/min


 


Estimated GFR (MDRD)    


 


BUN/Creatinine Ratio    


 


Glucose    ()  mg/dL


 


Calcium    (8.4-10.2)  mg/dl


 


Total Bilirubin    (0.2-1.0)  mg/dL


 


AST    (10-42)  IU/L


 


ALT    (10-60)  IU/L


 


Alkaline Phosphatase    ()  IU/L


 


Total Protein    (6.7-8.2)  g/dl


 


Albumin    (3.2-5.5)  g/dl


 


Globulin    


 


Albumin/Globulin Ratio    


 


Urine Color  Yellow   (YELLOW)  


 


Urine Appearance  Slightly cloudy   (CLEAR)  


 


Urine pH  5.5   (5.0-9.0)  


 


Ur Specific Gravity  >= 1.030   (1.005-1.030)  


 


Urine Protein  Negative   (NEGATIVE)  


 


Urine Glucose (UA)  Negative   (NEGATIVE)  


 


Urine Ketones  Negative   (NEGATIVE)  


 


Urine Occult Blood  Trace-intact H   (NEGATIVE)  


 


Urine Nitrite  Negative   (NEGATIVE)  


 


Urine Bilirubin  Small H   (NEGATIVE)  


 


Urine Urobilinogen  0.2   (0.2-1.0)  mg/dL


 


Ur Leukocyte Esterase  Negative   (NEGATIVE)  


 


Urine RBC  5-10 H   /HPF


 


Urine WBC  0-5   (0-5/HPF)  /HPF


 


Ur Epithelial Cells  Rare   /HPF


 


Amorphous Sediment  Rare   (0/HPF)  /HPF


 


Urine Bacteria  Rare   (0-FEW/HPF)  /HPF


 


Urine Mucus  Rare   /LPF


 


Urinalysis Comment     


 


Salicylates    mg/dL


 


Urine Opiates Screen   Negative  (NEGATIVE)  


 


Ur Oxycodone Screen   Negative  (NEGATIVE)  


 


Urine Methadone Screen   Negative  (NEGATIVE)  


 


Acetaminophen    ug/mL


 


Ur Barbiturates Screen   Negative  (NEGATIVE)  


 


U Tricyclic Antidepress   Positive H  (NEGATIVE)  


 


Ur Phencyclidine Scrn   Negative  (NEGATIVE)  


 


Ur Amphetamine Screen   Negative  (NEGATIVE)  


 


U Methamphetamines Scrn   Negative  (NEGATIVE)  


 


Urine MDMA Screen   Negative  (NEGATIVE)  


 


U Benzodiazepines Scrn   Positive H  (NEGATIVE)  


 


Urine Cocaine Screen   Negative  (NEGATIVE)  


 


U Marijuana (THC) Screen   Negative  (NEGATIVE)  


 


Ethyl Alcohol    mg/dL








Medications














Discontinued Medications














Generic Name Dose Route Start Last Admin





  Trade Name Freq  PRN Reason Stop Dose Admin


 


Lorazepam  1 mg  02/16/19 17:48  





  Ativan  PO  02/16/19 17:49  





  ONETIME ONE   





     





     





     





     











Re-Assessment/Re-Exam: 





[place] [facility] [place]. A safety plan was 

established with the patient and her . [place] 

[facility] [place]as Monday is a holiday). 





Departure





- Departure


Time of Disposition: 18:01


Disposition: Home, Self-Care 01


Condition: Fair


Clinical Impression: 


 Suicidal ideation, Anxiety








- Discharge Information


*PRESCRIPTION DRUG MONITORING PROGRAM REVIEWED*: Not Applicable


*COPY OF PRESCRIPTION DRUG MONITORING REPORT IN PATIENT ROHAN: Not Applicable


Instructions:  Panic Attack, Easy-to-Read, Suicidal Feelings: How to Help 

Yourself


Forms:  ED Department Discharge


Care Plan Goals: 


The patient was advised of the examination and lab results during the visit. 

The patient was given an oral dose of Ativan while in the ED. The patient was 

discharged with Ativan (0.5 mg) #8 to take 1 by mouth every 6 hours as needed 

for anxiety. If the patient has any additional symptoms or concerns, the 

patient should either visit her primary care facility or return to the 

emergency department. 





I have read and agree with the documentation that has been completed regarding 

this visit. By signing this record, I attest that the documentation was 

completed in my physical presence and is an accurate record of the encounter.